# Patient Record
Sex: MALE | Race: WHITE | NOT HISPANIC OR LATINO | Employment: FULL TIME | URBAN - METROPOLITAN AREA
[De-identification: names, ages, dates, MRNs, and addresses within clinical notes are randomized per-mention and may not be internally consistent; named-entity substitution may affect disease eponyms.]

---

## 2017-09-11 ENCOUNTER — HOSPITAL ENCOUNTER (EMERGENCY)
Facility: CLINIC | Age: 27
Discharge: HOME | End: 2017-09-11
Attending: EMERGENCY MEDICINE | Admitting: EMERGENCY MEDICINE
Payer: COMMERCIAL

## 2017-09-11 ENCOUNTER — APPOINTMENT (OUTPATIENT)
Dept: CT IMAGING | Facility: CLINIC | Age: 27
End: 2017-09-11
Attending: EMERGENCY MEDICINE
Payer: COMMERCIAL

## 2017-09-11 VITALS
DIASTOLIC BLOOD PRESSURE: 92 MMHG | WEIGHT: 179.5 LBS | OXYGEN SATURATION: 100 % | HEART RATE: 76 BPM | RESPIRATION RATE: 16 BRPM | SYSTOLIC BLOOD PRESSURE: 148 MMHG | TEMPERATURE: 97.5 F

## 2017-09-11 DIAGNOSIS — S30.1XXA ABDOMINAL WALL CONTUSION: ICD-10-CM
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
## 2017-09-11 LAB
ALBUMIN UR-MCNC: NEGATIVE MG/DL
APPEARANCE UR: CLEAR
BILIRUB UR QL STRIP: NEGATIVE
COLOR UR AUTO: YELLOW
CREAT BLD-MCNC: 1 MG/DL (ref 0.66–1.25)
GFR SERPL CREATININE-BSD FRML MDRD: 90 ML/MIN/1.7M2
GLUCOSE UR STRIP-MCNC: NEGATIVE MG/DL
HGB BLD-MCNC: 15.9 G/DL (ref 13.3–17.7)
HGB UR QL STRIP: NEGATIVE
KETONES UR STRIP-MCNC: NEGATIVE MG/DL
LEUKOCYTE ESTERASE UR QL STRIP: NEGATIVE
MUCOUS THREADS #/AREA URNS LPF: PRESENT /LPF
NITRATE UR QL: NEGATIVE
PH UR STRIP: 7 PH (ref 5–7)
RBC #/AREA URNS AUTO: <1 /HPF (ref 0–2)
SOURCE: ABNORMAL
SP GR UR STRIP: 1.01 (ref 1–1.03)
SQUAMOUS #/AREA URNS AUTO: <1 /HPF (ref 0–1)
UROBILINOGEN UR STRIP-MCNC: NORMAL MG/DL (ref 0–2)
WBC #/AREA URNS AUTO: <1 /HPF (ref 0–2)

## 2017-09-11 PROCEDURE — 99285 EMERGENCY DEPT VISIT HI MDM: CPT | Mod: Z6 | Performed by: EMERGENCY MEDICINE

## 2017-09-11 PROCEDURE — 25000128 H RX IP 250 OP 636: Performed by: EMERGENCY MEDICINE

## 2017-09-11 PROCEDURE — 74177 CT ABD & PELVIS W/CONTRAST: CPT

## 2017-09-11 PROCEDURE — 85018 HEMOGLOBIN: CPT | Performed by: EMERGENCY MEDICINE

## 2017-09-11 PROCEDURE — 82565 ASSAY OF CREATININE: CPT

## 2017-09-11 PROCEDURE — 81001 URINALYSIS AUTO W/SCOPE: CPT | Performed by: EMERGENCY MEDICINE

## 2017-09-11 PROCEDURE — 25000125 ZZHC RX 250: Performed by: EMERGENCY MEDICINE

## 2017-09-11 PROCEDURE — 99285 EMERGENCY DEPT VISIT HI MDM: CPT | Mod: 25 | Performed by: EMERGENCY MEDICINE

## 2017-09-11 PROCEDURE — 96360 HYDRATION IV INFUSION INIT: CPT | Performed by: EMERGENCY MEDICINE

## 2017-09-11 RX ORDER — IOPAMIDOL 755 MG/ML
100 INJECTION, SOLUTION INTRAVASCULAR ONCE
Status: COMPLETED | OUTPATIENT
Start: 2017-09-11 | End: 2017-09-11

## 2017-09-11 RX ORDER — IBUPROFEN 200 MG
600 TABLET ORAL 3 TIMES DAILY
Qty: 63 TABLET | Refills: 0 | Status: SHIPPED | OUTPATIENT
Start: 2017-09-11 | End: 2017-09-18

## 2017-09-11 RX ORDER — SODIUM CHLORIDE 9 MG/ML
1000 INJECTION, SOLUTION INTRAVENOUS CONTINUOUS
Status: DISCONTINUED | OUTPATIENT
Start: 2017-09-11 | End: 2017-09-11 | Stop reason: HOSPADM

## 2017-09-11 RX ADMIN — SODIUM CHLORIDE 62 ML: 9 INJECTION, SOLUTION INTRAVENOUS at 11:23

## 2017-09-11 RX ADMIN — SODIUM CHLORIDE 500 ML: 9 INJECTION, SOLUTION INTRAVENOUS at 10:32

## 2017-09-11 RX ADMIN — IOPAMIDOL 88 ML: 755 INJECTION, SOLUTION INTRAVENOUS at 11:22

## 2017-09-11 ASSESSMENT — ENCOUNTER SYMPTOMS
SHORTNESS OF BREATH: 0
DYSURIA: 0
VOMITING: 0
DIFFICULTY URINATING: 0
BLOOD IN STOOL: 0
ANAL BLEEDING: 0
HEMATURIA: 0
FREQUENCY: 0
ABDOMINAL PAIN: 1

## 2017-09-11 NOTE — ED NOTES
Pt to d/c home. Discussed AVS and rx medication. All questions answered at this time. No concern for safe d/c.

## 2017-09-11 NOTE — ED AVS SNAPSHOT
[facility], [place], Emergency Department    [address]    [place] [address]    Phone:  [phone]    Fax:  [phone]                                       John Judd   MRN: [id number]    Department:  [facility], Emergency Department   Date of Visit:  9/11/2017           After Visit Summary Signature Page     I have received my discharge instructions, and my questions have been answered. I have discussed any challenges I see with this plan with the nurse or doctor.    ..........................................................................................................................................  Patient/Patient Representative Signature      ..........................................................................................................................................  Patient Representative Print Name and Relationship to Patient    ..................................................               ................................................  Date                                            Time    ..........................................................................................................................................  Reviewed by Signature/Title    ...................................................              ..............................................  Date                                                            Time

## 2017-09-11 NOTE — ED AVS SNAPSHOT
[facility], Emergency Department    [address]    [place] [address]    Phone:  [phone]    Fax:  [phone]                                       John Judd   MRN: [id number]    Department:  [facility], Emergency Department   Date of Visit:  9/11/2017           Patient Information     Date Of Birth          1990        Your diagnoses for this visit were:     Abdominal wall contusion        You were seen by Clement Herrera MD.        Discharge Instructions       Please make an appointment to follow up with Your Primary Care Provider or our Primary Care Center (phone: [phone]) in one week if not improving.    Discharge References/Attachments     SOFT TISSUE CONTUSION (ENGLISH)      24 Hour Appointment Hotline       To make an appointment at any [facility] clinic, call [phone] ([phone]). If you don't have a family doctor or clinic, we will help you find one. [facility] clinics are conveniently located to serve the needs of you and your family.             Review of your medicines      START taking        Dose / Directions Last dose taken    ibuprofen 200 MG tablet   Commonly known as:  ADVIL/MOTRIN   Dose:  600 mg   Quantity:  63 tablet        Take 3 tablets (600 mg) by mouth 3 times daily for 7 days   Refills:  0                Prescriptions were sent or printed at these locations (1 Prescription)                   Other Prescriptions                Printed at Department/Unit printer (1 of 1)         ibuprofen (ADVIL/MOTRIN) 200 MG tablet                Procedures and tests performed during your visit     CT Abdomen Pelvis w Contrast    Creatinine POCT    Hemoglobin    ISTAT creatinine nursing POCT    Peripheral IV: Standard    UA with Microscopic      Orders Needing Specimen Collection     None      Pending Results     Date and Time Order Name Status Description    9/11/2017 1008 CT Abdomen Pelvis w Contrast Preliminary             Pending Culture Results     No  "orders found from 2017 to 2017.            Pending Results Instructions     If you had any lab results that were not finalized at the time of your Discharge, you can call the ED Lab Result RN at [phone]. You will be contacted by this team for any positive Lab results or changes in treatment. The nurses are available 7 days a week from 10A to 6:30P.  You can leave a message 24 hours per day and they will return your call.        Thank you for choosing [facility]       Thank you for choosing [facility] for your care. Our goal is always to provide you with excellent care. Hearing back from our patients is one way we can continue to improve our services. Please take a few minutes to complete the written survey that you may receive in the mail after you visit with us. Thank you!        [employer] Information     [employer] lets you send messages to your doctor, view your test results, renew your prescriptions, schedule appointments and more. To sign up, go to [email or web address] . Click on \"Log in\" on the left side of the screen, which will take you to the Welcome page. Then click on \"Sign up Now\" on the right side of the page.     You will be asked to enter the access code listed below, as well as some personal information. Please follow the directions to create your username and password.     Your access code is: [email or web address]  Expires: 12/10/2017 12:03 PM     Your access code will  in 90 days. If you need help or a new code, please call your [facility] clinic or [phone].        Care EveryWhere ID     This is your Care EveryWhere ID. This could be used by other organizations to access your [facility] medical records  [id number]        Equal Access to Services     MIRNA LOZANO : Hadii julien Torres, silvestre sharma, milli ramirez . So [facility] [phone].    ATENCIÓN: Si habla español, tiene a mckeon disposición servicios gratuitos de asistencia " juan carlos Lehmankrishna al [phone].    We comply with applicable federal civil rights laws and [place] laws. We do not discriminate on the basis of race, color, national origin, age, disability sex, sexual orientation or gender identity.            After Visit Summary       This is your record. Keep this with you and show to your community pharmacist(s) and doctor(s) at your next visit.
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
## 2017-09-11 NOTE — DISCHARGE INSTRUCTIONS
Please make an appointment to follow up with Your Primary Care Provider or our Primary Care Center (phone: [phone]) in one week if not improving.

## 2018-05-09 ENCOUNTER — OFFICE VISIT - HEALTHEAST (OUTPATIENT)
Dept: FAMILY MEDICINE | Facility: CLINIC | Age: 28
End: 2018-05-09

## 2018-05-09 DIAGNOSIS — Z13.21 SCREENING FOR ENDOCRINE, NUTRITIONAL, METABOLIC AND IMMUNITY DISORDER: ICD-10-CM

## 2018-05-09 DIAGNOSIS — F41.9 ANXIETY AND DEPRESSION: ICD-10-CM

## 2018-05-09 DIAGNOSIS — G43.909 MIGRAINE HEADACHE: ICD-10-CM

## 2018-05-09 DIAGNOSIS — Z13.0 SCREENING FOR ENDOCRINE, NUTRITIONAL, METABOLIC AND IMMUNITY DISORDER: ICD-10-CM

## 2018-05-09 DIAGNOSIS — F32.A ANXIETY AND DEPRESSION: ICD-10-CM

## 2018-05-09 DIAGNOSIS — Z00.00 VISIT FOR PREVENTIVE HEALTH EXAMINATION: ICD-10-CM

## 2018-05-09 DIAGNOSIS — Z13.228 SCREENING FOR ENDOCRINE, NUTRITIONAL, METABOLIC AND IMMUNITY DISORDER: ICD-10-CM

## 2018-05-09 DIAGNOSIS — Z13.29 SCREENING FOR ENDOCRINE, NUTRITIONAL, METABOLIC AND IMMUNITY DISORDER: ICD-10-CM

## 2018-05-09 LAB
ALBUMIN SERPL-MCNC: 4.3 G/DL (ref 3.5–5)
ALP SERPL-CCNC: 44 U/L (ref 45–120)
ALT SERPL W P-5'-P-CCNC: 20 U/L (ref 0–45)
ANION GAP SERPL CALCULATED.3IONS-SCNC: 8 MMOL/L (ref 5–18)
AST SERPL W P-5'-P-CCNC: 16 U/L (ref 0–40)
BILIRUB SERPL-MCNC: 0.9 MG/DL (ref 0–1)
BUN SERPL-MCNC: 11 MG/DL (ref 8–22)
CALCIUM SERPL-MCNC: 10 MG/DL (ref 8.5–10.5)
CHLORIDE BLD-SCNC: 102 MMOL/L (ref 98–107)
CHOLEST SERPL-MCNC: 189 MG/DL
CO2 SERPL-SCNC: 30 MMOL/L (ref 22–31)
CREAT SERPL-MCNC: 0.88 MG/DL (ref 0.7–1.3)
ERYTHROCYTE [DISTWIDTH] IN BLOOD BY AUTOMATED COUNT: 13.1 % (ref 11–14.5)
FASTING STATUS PATIENT QL REPORTED: YES
GFR SERPL CREATININE-BSD FRML MDRD: >60 ML/MIN/1.73M2
GLUCOSE BLD-MCNC: 95 MG/DL (ref 70–125)
HBA1C MFR BLD: 4.9 % (ref 3.5–6)
HCT VFR BLD AUTO: 44 % (ref 40–54)
HDLC SERPL-MCNC: 68 MG/DL
HGB BLD-MCNC: 15.1 G/DL (ref 14–18)
LDLC SERPL CALC-MCNC: 106 MG/DL
MCH RBC QN AUTO: 30.2 PG (ref 27–34)
MCHC RBC AUTO-ENTMCNC: 34.2 G/DL (ref 32–36)
MCV RBC AUTO: 88 FL (ref 80–100)
PLATELET # BLD AUTO: 199 THOU/UL (ref 140–440)
PMV BLD AUTO: 7.3 FL (ref 7–10)
POTASSIUM BLD-SCNC: 4.6 MMOL/L (ref 3.5–5)
PROT SERPL-MCNC: 7.4 G/DL (ref 6–8)
RBC # BLD AUTO: 4.98 MILL/UL (ref 4.4–6.2)
SODIUM SERPL-SCNC: 140 MMOL/L (ref 136–145)
TRIGL SERPL-MCNC: 75 MG/DL
WBC: 5 THOU/UL (ref 4–11)

## 2018-05-09 ASSESSMENT — MIFFLIN-ST. JEOR: SCORE: 1892.26

## 2018-05-10 LAB
25(OH)D3 SERPL-MCNC: 18.2 NG/ML (ref 30–80)
25(OH)D3 SERPL-MCNC: 18.2 NG/ML (ref 30–80)

## 2018-05-11 ENCOUNTER — AMBULATORY - HEALTHEAST (OUTPATIENT)
Dept: FAMILY MEDICINE | Facility: CLINIC | Age: 28
End: 2018-05-11

## 2018-05-11 DIAGNOSIS — E55.9 VITAMIN D DEFICIENCY DISEASE: ICD-10-CM

## 2021-06-01 VITALS — WEIGHT: 196.5 LBS | BODY MASS INDEX: 26.04 KG/M2 | HEIGHT: 73 IN

## 2021-06-16 PROBLEM — G43.909 MIGRAINE HEADACHE: Status: ACTIVE | Noted: 2018-05-09

## 2021-06-16 PROBLEM — E55.9 VITAMIN D DEFICIENCY DISEASE: Status: ACTIVE | Noted: 2018-05-11
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
## 2021-08-08 ENCOUNTER — HEALTH MAINTENANCE LETTER (OUTPATIENT)
Age: 31
End: 2021-08-08

## 2021-09-05 ENCOUNTER — E-VISIT (OUTPATIENT)
Dept: URGENT CARE | Facility: URGENT CARE | Age: 31
End: 2021-09-05
Payer: COMMERCIAL

## 2021-09-05 DIAGNOSIS — Z20.822 SUSPECTED COVID-19 VIRUS INFECTION: Primary | ICD-10-CM

## 2021-09-05 PROCEDURE — 99421 OL DIG E/M SVC 5-10 MIN: CPT | Performed by: FAMILY MEDICINE

## 2021-09-05 NOTE — PATIENT INSTRUCTIONS
Dear John Judd,    Your symptoms show that you may have coronavirus (COVID-19). This illness can cause fever, cough and trouble breathing. Many people get a mild case and get better on their own. Some people can get very sick.    Will I be tested for COVID-19?  We would like to test you for Covid-19 virus. I have placed orders for this test.     For all employees or close contacts (except [employer] and [employer] - see below), go to your [employer] home page and scroll down to the section that says  You have an appointment that needs to be scheduled  and click the large green button that says  Schedule Now  and follow the steps to find the next available opening.     If you are unable to complete these steps or if you cannot find any available times, please call [phone] to schedule employee testing.     [facility] employees or close contacts, please call [phone].   [facility]) employees or close contacts call [phone].    Return to work/school/ guidance:  Please let your workplace manager and staffing office know when your quarantine ends     We can t give you an exact date as it depends on the above. You can calculate this on your own or work with your manager/staffing office to calculate this. (For example if you were exposed on 10/4, you would have to quarantine for 14 full days. That would be through 10/18. You could return on 10/19.)      If you receive a positive COVID-19 test result, follow the guidance of the those who are giving you the results. Usually the return to work is 10 (or in some cases 20 days from symptom onset.) If you work at [facility] [employer], you must also be cleared by Employee Occupational Health and Safety to return to work.        If you receive a negative COVID-19 test result and did not have a high risk exposure to someone with a known positive COVID-19 test, you can return to work once you're free of fever for 24 hours without fever-reducing medication and  your symptoms are improving or resolved.      If you receive a negative COVID-19 test and If you had a high risk exposure to someone who has tested positive for COVID-19 then you can return to work 14 days after your last contact with the positive individual    Note: If you have ongoing exposure to the covid positive person, this quarantine period may be more than 14 days. (For example, if you are continued to be exposed to your child who tested positive and cannot isolate from them, then the quarantine of 7-14 days can't start until your child is no longer contagious. This is typically 10 days from onset of the child's symptoms. So the total duration may be 17-24 days in this case.)    Sign up for [employer].   We know it's scary to hear that you might have COVID-19. We want to track your symptoms to make sure you're okay over the next 2 weeks. Please look for an email from [employer]--this is a free, online program that we'll use to keep in touch. To sign up, follow the link in the email you will receive. Learn more at [email or web address]    How can I take care of myself?    Get lots of rest. Drink extra fluids (unless a doctor has told you not to)    Take Tylenol (acetaminophen) or ibuprofen for fever or pain. If you have liver or kidney problems, ask your family doctor if it's okay to take Tylenol o ibuprofen    If you have other health problems (like cancer, heart failure, an organ transplant or severe kidney disease): Call your specialty clinic if you don't feel better in the next 2 days.    Know when to call 911. Emergency warning signs include:  o Trouble breathing or shortness of breath  o Pain or pressure in the chest that doesn't go away  o Feeling confused like you haven't felt before, or not being able to wake up  o Bluish-colored lips or face    Where can I get more information?   [employer] - About COVID-19:   [email or web address]    CDC - What to Do If You're Sick:    www.cdc.gov/coronavirus/2019-ncov/about/steps-when-sick.html

## 2021-09-07 ENCOUNTER — LAB (OUTPATIENT)
Dept: FAMILY MEDICINE | Facility: CLINIC | Age: 31
End: 2021-09-07
Attending: FAMILY MEDICINE
Payer: COMMERCIAL

## 2021-09-07 DIAGNOSIS — Z20.822 SUSPECTED COVID-19 VIRUS INFECTION: ICD-10-CM

## 2021-09-07 PROCEDURE — U0005 INFEC AGEN DETEC AMPLI PROBE: HCPCS

## 2021-09-07 PROCEDURE — U0003 INFECTIOUS AGENT DETECTION BY NUCLEIC ACID (DNA OR RNA); SEVERE ACUTE RESPIRATORY SYNDROME CORONAVIRUS 2 (SARS-COV-2) (CORONAVIRUS DISEASE [COVID-19]), AMPLIFIED PROBE TECHNIQUE, MAKING USE OF HIGH THROUGHPUT TECHNOLOGIES AS DESCRIBED BY CMS-2020-01-R: HCPCS

## 2021-09-08 LAB — SARS-COV-2 RNA RESP QL NAA+PROBE: NEGATIVE

## 2021-10-03 ENCOUNTER — HEALTH MAINTENANCE LETTER (OUTPATIENT)
Age: 31
End: 2021-10-03

## 2021-10-18 ENCOUNTER — OFFICE VISIT (OUTPATIENT)
Dept: FAMILY MEDICINE | Facility: CLINIC | Age: 31
End: 2021-10-18
Payer: COMMERCIAL

## 2021-10-18 VITALS
DIASTOLIC BLOOD PRESSURE: 82 MMHG | HEIGHT: 73 IN | WEIGHT: 218.9 LBS | HEART RATE: 80 BPM | BODY MASS INDEX: 29.01 KG/M2 | SYSTOLIC BLOOD PRESSURE: 122 MMHG

## 2021-10-18 DIAGNOSIS — Z11.3 SCREEN FOR STD (SEXUALLY TRANSMITTED DISEASE): ICD-10-CM

## 2021-10-18 DIAGNOSIS — G43.109 MIGRAINE WITH AURA AND WITHOUT STATUS MIGRAINOSUS, NOT INTRACTABLE: ICD-10-CM

## 2021-10-18 DIAGNOSIS — E55.9 VITAMIN D DEFICIENCY: ICD-10-CM

## 2021-10-18 DIAGNOSIS — N52.9 MALE ERECTILE DISORDER: ICD-10-CM

## 2021-10-18 DIAGNOSIS — Z11.59 NEED FOR HEPATITIS C SCREENING TEST: ICD-10-CM

## 2021-10-18 DIAGNOSIS — Z13.220 LIPID SCREENING: ICD-10-CM

## 2021-10-18 DIAGNOSIS — Z00.00 ENCOUNTER FOR ROUTINE HISTORY AND PHYSICAL EXAM FOR MALE: Primary | ICD-10-CM

## 2021-10-18 LAB
ALBUMIN SERPL-MCNC: 4.5 G/DL (ref 3.5–5)
ALP SERPL-CCNC: 50 U/L (ref 45–120)
ALT SERPL W P-5'-P-CCNC: 23 U/L (ref 0–45)
ANION GAP SERPL CALCULATED.3IONS-SCNC: 11 MMOL/L (ref 5–18)
AST SERPL W P-5'-P-CCNC: 15 U/L (ref 0–40)
BILIRUB SERPL-MCNC: 0.8 MG/DL (ref 0–1)
BUN SERPL-MCNC: 17 MG/DL (ref 8–22)
CALCIUM SERPL-MCNC: 10.1 MG/DL (ref 8.5–10.5)
CHLORIDE BLD-SCNC: 102 MMOL/L (ref 98–107)
CHOLEST SERPL-MCNC: 229 MG/DL
CO2 SERPL-SCNC: 27 MMOL/L (ref 22–31)
CREAT SERPL-MCNC: 0.9 MG/DL (ref 0.7–1.3)
FASTING STATUS PATIENT QL REPORTED: YES
GFR SERPL CREATININE-BSD FRML MDRD: >90 ML/MIN/1.73M2
GLUCOSE BLD-MCNC: 96 MG/DL (ref 70–125)
HDLC SERPL-MCNC: 64 MG/DL
HIV 1+2 AB+HIV1 P24 AG SERPL QL IA: NEGATIVE
LDLC SERPL CALC-MCNC: 144 MG/DL
POTASSIUM BLD-SCNC: 4.5 MMOL/L (ref 3.5–5)
PROT SERPL-MCNC: 7.6 G/DL (ref 6–8)
SODIUM SERPL-SCNC: 140 MMOL/L (ref 136–145)
TRIGL SERPL-MCNC: 107 MG/DL
TSH SERPL DL<=0.005 MIU/L-ACNC: 1.14 UIU/ML (ref 0.3–5)

## 2021-10-18 PROCEDURE — 99214 OFFICE O/P EST MOD 30 MIN: CPT | Mod: 25 | Performed by: NURSE PRACTITIONER

## 2021-10-18 PROCEDURE — 86780 TREPONEMA PALLIDUM: CPT | Performed by: NURSE PRACTITIONER

## 2021-10-18 PROCEDURE — 87389 HIV-1 AG W/HIV-1&-2 AB AG IA: CPT | Performed by: NURSE PRACTITIONER

## 2021-10-18 PROCEDURE — 84270 ASSAY OF SEX HORMONE GLOBUL: CPT | Performed by: NURSE PRACTITIONER

## 2021-10-18 PROCEDURE — 82306 VITAMIN D 25 HYDROXY: CPT | Performed by: NURSE PRACTITIONER

## 2021-10-18 PROCEDURE — 99395 PREV VISIT EST AGE 18-39: CPT | Performed by: NURSE PRACTITIONER

## 2021-10-18 PROCEDURE — 80053 COMPREHEN METABOLIC PANEL: CPT | Performed by: NURSE PRACTITIONER

## 2021-10-18 PROCEDURE — 87591 N.GONORRHOEAE DNA AMP PROB: CPT | Performed by: NURSE PRACTITIONER

## 2021-10-18 PROCEDURE — 84403 ASSAY OF TOTAL TESTOSTERONE: CPT | Performed by: NURSE PRACTITIONER

## 2021-10-18 PROCEDURE — 84443 ASSAY THYROID STIM HORMONE: CPT | Performed by: NURSE PRACTITIONER

## 2021-10-18 PROCEDURE — 36415 COLL VENOUS BLD VENIPUNCTURE: CPT | Performed by: NURSE PRACTITIONER

## 2021-10-18 PROCEDURE — 80061 LIPID PANEL: CPT | Performed by: NURSE PRACTITIONER

## 2021-10-18 PROCEDURE — 86803 HEPATITIS C AB TEST: CPT | Performed by: NURSE PRACTITIONER

## 2021-10-18 PROCEDURE — 87491 CHLMYD TRACH DNA AMP PROBE: CPT | Performed by: NURSE PRACTITIONER

## 2021-10-18 RX ORDER — SUMATRIPTAN 25 MG/1
25 TABLET, FILM COATED ORAL
Qty: 10 TABLET | Refills: 3 | Status: SHIPPED | OUTPATIENT
Start: 2021-10-18 | End: 2023-03-30

## 2021-10-18 ASSESSMENT — MIFFLIN-ST. JEOR: SCORE: 2001.8

## 2021-10-18 NOTE — PROGRESS NOTES
Assessment and Plan:     Encounter for routine history and physical exam for male  Recommend consuming a healthy diet and exercising.  He is up-to-date on vaccinations.  I congratulated him on alcohol cessation.  - Comprehensive metabolic panel (BMP + Alb, Alk Phos, ALT, AST, Total. Bili, TP)  - Comprehensive metabolic panel (BMP + Alb, Alk Phos, ALT, AST, Total. Bili, TP)    Lipid screening  - Lipid panel reflex to direct LDL Fasting  - Lipid panel reflex to direct LDL Fasting    Need for hepatitis C screening test  - Hepatitis C antibody  - Hepatitis C antibody    Screen for STD (sexually transmitted disease)  Discussed safe sex practices.  Will notify patient of results.  - HIV Antigen Antibody Combo  - Treponema Abs w Reflex to RPR and Titer  - Chlamydia trachomatis PCR  - Neisseria gonorrhoeae PCR  - HIV Antigen Antibody Combo  - Treponema Abs w Reflex to RPR and Titer    Migraine with aura and without status migrainosus, not intractable  Discussed treatment options.  Will start sumatriptan as needed.  Educated on indications and side effects.  If no relief, may consider prophylactic medication.  - SUMAtriptan (IMITREX) 25 MG tablet  Dispense: 10 tablet; Refill: 3    Male erectile disorder  We will rule out thyroid disease and hypogonadism.  Discussed the mental aspect erections.  Anxiety may be contributing.  - TSH with free T4 reflex  - Testosterone Free and Total  - TSH with free T4 reflex  - Testosterone Free and Total    Vitamin D deficiency  He has a history of vitamin D deficiency.  He does occasionally take a vitamin D supplement.  - Vitamin D Deficiency  - Vitamin D Deficiency        Subjective:     John is a 31 year old male presenting to the clinic for a male physical.  Patient has been  since 2017.  He has 2-year-old son.  Over the past year, he has engaged in an open marriage.  He is interested in STD screening.  He denies any penile discharge or irritation.  He denies dysuria.  His wife  recently tested negative for STDs.  They are considering trying to conceive another child.  Patient is trying to consume healthy diet.  He does not exercise, but states they bought an elliptical recently and he plans on exercising more during the winter.  He has a history of migraine headaches.  He was diagnosed at the age of 12.  Over the past year, his headaches have worsened.  He is obtaining a headache 3 times per week within the bilateral temporal region.  He does have some chronic neck pain and has been seeing a chiropractor.  He complains of blurry vision and an aura.  He experiences photophobia.  He denies nausea, vomiting, muscular weakness.  He occasionally has numbness and tingling within his hands.  He has tried Tylenol, Advil, Excedrin with minimal relief.  Lastly, patient states over the past few months he has had difficulty obtaining and maintaining an erection. He is interested in evaluating this further.  He has had some minimal weight gain.  He states he and his wife were abusing alcohol from May-September.  They quit drinking Labor Day.     Answers for HPI/ROS submitted by the patient on 10/15/2021  Frequency of exercise:: 1 day/week  Getting at least 3 servings of Calcium per day:: Yes  Diet:: Regular (no restrictions)  Taking medications regularly:: Yes  Medication side effects:: Not applicable  Bi-annual eye exam:: Yes  Dental care twice a year:: Yes  Sleep apnea or symptoms of sleep apnea:: Daytime drowsiness  Additional concerns today:: Yes  Duration of exercise:: Other    Reviewof Systems: A complete 14 point review of systems was obtained and is negative or as stated in the history of present illness.    Social History     Socioeconomic History     Marital status:      Spouse name: Not on file     Number of children: Not on file     Years of education: Not on file     Highest education level: Not on file   Occupational History     Not on file   Tobacco Use     Smoking status: Never  "Smoker     Smokeless tobacco: Never Used   Substance and Sexual Activity     Alcohol use: Yes     Comment: social     Drug use: No     Sexual activity: Yes     Partners: Female   Other Topics Concern     Not on file   Social History Narrative     Not on file     Social Determinants of Health     Financial Resource Strain:      Difficulty of Paying Living Expenses:    Food Insecurity:      Worried About Running Out of Food in the Last Year:      Ran Out of Food in the Last Year:    Transportation Needs:      Lack of Transportation (Medical):      Lack of Transportation (Non-Medical):    Physical Activity:      Days of Exercise per Week:      Minutes of Exercise per Session:    Stress:      Feeling of Stress :    Social Connections:      Frequency of Communication with Friends and Family:      Frequency of Social Gatherings with Friends and Family:      Attends Druze Services:      Active Member of Clubs or Organizations:      Attends Club or Organization Meetings:      Marital Status:    Intimate Partner Violence:      Fear of Current or Ex-Partner:      Emotionally Abused:      Physically Abused:      Sexually Abused:        Active Ambulatory Problems     Diagnosis Date Noted     Migraine headache 05/09/2018     Vitamin D deficiency disease 05/11/2018     Resolved Ambulatory Problems     Diagnosis Date Noted     No Resolved Ambulatory Problems     No Additional Past Medical History       Family History   Problem Relation Age of Onset     Diabetes Mother         Type 2 DM     Hyperlipidemia Mother      Diabetes Father         Type 2 DM     Hyperlipidemia Father      GI problems Paternal Grandmother        Objective:     /82 (BP Location: Right arm, Patient Position: Sitting, Cuff Size: Adult Large)   Pulse 80   Ht 1.854 m (6' 1\")   Wt 99.3 kg (218 lb 14.4 oz)   BMI 28.88 kg/m      Patient is alert, in no obvious distress.   Skin: Warm, dry.  No lesions or rashes.  Skin turgor rapid return.   HEENT:  " Head normocephalic, atraumatic.  Eyes normal.   Ears normal.  Nose patent, mucosa pink.  Oropharynx mucosa pink.  No lesions or tonsillar enlargement.   Neck: Supple, no lymphadenopathy. No thyromegaly.  Lungs:  Clear to auscultation. Respirations even and unlabored.  No wheezing or rales noted.   Heart:  Regular rate and rhythm.  No murmurs, S3, S4, gallops, or rubs.    Abdomen: Soft, nontender.  No organomegaly. Bowel sounds normoactive. No guarding or masses noted.   :  deferred  Musculoskeletal:  Full ROM of extremities.    Neurological: No obvious gross or fine motor deficits are present.

## 2021-10-19 LAB
DEPRECATED CALCIDIOL+CALCIFEROL SERPL-MC: 25 UG/L (ref 30–80)
HCV AB SERPL QL IA: NEGATIVE
SHBG SERPL-SCNC: 20 NMOL/L (ref 11–80)
T PALLIDUM AB SER QL: NEGATIVE

## 2021-10-20 LAB
C TRACH DNA SPEC QL NAA+PROBE: NEGATIVE
N GONORRHOEA DNA SPEC QL NAA+PROBE: NEGATIVE

## 2021-10-21 LAB
SHBG SERPL-SCNC: 20 NMOL/L (ref 11–80)
TESTOST FREE SERPL-MCNC: 6.82 NG/DL
TESTOST SERPL-MCNC: 270 NG/DL (ref 240–950)

## 2022-09-11 ENCOUNTER — HEALTH MAINTENANCE LETTER (OUTPATIENT)
Age: 32
End: 2022-09-11

## 2023-01-22 ENCOUNTER — HEALTH MAINTENANCE LETTER (OUTPATIENT)
Age: 33
End: 2023-01-22

## 2023-03-29 ASSESSMENT — ENCOUNTER SYMPTOMS
EYE PAIN: 0
HEMATURIA: 0
DYSURIA: 0
ARTHRALGIAS: 1
HEADACHES: 1
ABDOMINAL PAIN: 0
MYALGIAS: 1
FEVER: 0
PARESTHESIAS: 1
NERVOUS/ANXIOUS: 1
DIARRHEA: 0
SHORTNESS OF BREATH: 0
SORE THROAT: 0
CONSTIPATION: 0
HEARTBURN: 0
COUGH: 0
CHILLS: 0
DIZZINESS: 0
HEMATOCHEZIA: 0
JOINT SWELLING: 0
WEAKNESS: 0
FREQUENCY: 0
NAUSEA: 0
PALPITATIONS: 0

## 2023-03-30 ENCOUNTER — OFFICE VISIT (OUTPATIENT)
Dept: FAMILY MEDICINE | Facility: CLINIC | Age: 33
End: 2023-03-30
Payer: COMMERCIAL

## 2023-03-30 VITALS
HEIGHT: 73 IN | SYSTOLIC BLOOD PRESSURE: 128 MMHG | WEIGHT: 219.2 LBS | TEMPERATURE: 98.2 F | BODY MASS INDEX: 29.05 KG/M2 | HEART RATE: 87 BPM | RESPIRATION RATE: 16 BRPM | DIASTOLIC BLOOD PRESSURE: 87 MMHG | OXYGEN SATURATION: 99 %

## 2023-03-30 DIAGNOSIS — M54.50 LUMBAR PAIN: ICD-10-CM

## 2023-03-30 DIAGNOSIS — R21 RASH: ICD-10-CM

## 2023-03-30 DIAGNOSIS — Z79.899 MEDICATION MANAGEMENT: ICD-10-CM

## 2023-03-30 DIAGNOSIS — Z13.220 LIPID SCREENING: ICD-10-CM

## 2023-03-30 DIAGNOSIS — M54.2 NECK PAIN: ICD-10-CM

## 2023-03-30 DIAGNOSIS — G43.109 MIGRAINE WITH AURA AND WITHOUT STATUS MIGRAINOSUS, NOT INTRACTABLE: ICD-10-CM

## 2023-03-30 DIAGNOSIS — Z11.3 SCREENING FOR STD (SEXUALLY TRANSMITTED DISEASE): ICD-10-CM

## 2023-03-30 DIAGNOSIS — Z00.00 ENCOUNTER FOR ROUTINE HISTORY AND PHYSICAL EXAM FOR MALE: Primary | ICD-10-CM

## 2023-03-30 LAB
ALBUMIN SERPL BCG-MCNC: 4.7 G/DL (ref 3.5–5.2)
ALP SERPL-CCNC: 58 U/L (ref 40–129)
ALT SERPL W P-5'-P-CCNC: 30 U/L (ref 10–50)
ANION GAP SERPL CALCULATED.3IONS-SCNC: 11 MMOL/L (ref 7–15)
AST SERPL W P-5'-P-CCNC: 21 U/L (ref 10–50)
BILIRUB SERPL-MCNC: 0.6 MG/DL
BUN SERPL-MCNC: 16.3 MG/DL (ref 6–20)
CALCIUM SERPL-MCNC: 9.7 MG/DL (ref 8.6–10)
CHLORIDE SERPL-SCNC: 104 MMOL/L (ref 98–107)
CHOLEST SERPL-MCNC: 183 MG/DL
CREAT SERPL-MCNC: 0.9 MG/DL (ref 0.67–1.17)
DEPRECATED HCO3 PLAS-SCNC: 26 MMOL/L (ref 22–29)
GFR SERPL CREATININE-BSD FRML MDRD: >90 ML/MIN/1.73M2
GLUCOSE SERPL-MCNC: 87 MG/DL (ref 70–99)
HBV SURFACE AB SERPL IA-ACNC: 2.15 M[IU]/ML
HBV SURFACE AB SERPL IA-ACNC: NONREACTIVE M[IU]/ML
HDLC SERPL-MCNC: 52 MG/DL
HIV 1+2 AB+HIV1 P24 AG SERPL QL IA: NONREACTIVE
LDLC SERPL CALC-MCNC: 112 MG/DL
NONHDLC SERPL-MCNC: 131 MG/DL
POTASSIUM SERPL-SCNC: 4.2 MMOL/L (ref 3.4–5.3)
PROT SERPL-MCNC: 7.6 G/DL (ref 6.4–8.3)
SODIUM SERPL-SCNC: 141 MMOL/L (ref 136–145)
TRIGL SERPL-MCNC: 96 MG/DL

## 2023-03-30 PROCEDURE — 99213 OFFICE O/P EST LOW 20 MIN: CPT | Mod: 25 | Performed by: NURSE PRACTITIONER

## 2023-03-30 PROCEDURE — 86706 HEP B SURFACE ANTIBODY: CPT | Performed by: NURSE PRACTITIONER

## 2023-03-30 PROCEDURE — 80053 COMPREHEN METABOLIC PANEL: CPT | Performed by: NURSE PRACTITIONER

## 2023-03-30 PROCEDURE — 87491 CHLMYD TRACH DNA AMP PROBE: CPT | Performed by: NURSE PRACTITIONER

## 2023-03-30 PROCEDURE — 87591 N.GONORRHOEAE DNA AMP PROB: CPT | Performed by: NURSE PRACTITIONER

## 2023-03-30 PROCEDURE — 36415 COLL VENOUS BLD VENIPUNCTURE: CPT | Performed by: NURSE PRACTITIONER

## 2023-03-30 PROCEDURE — 99395 PREV VISIT EST AGE 18-39: CPT | Performed by: NURSE PRACTITIONER

## 2023-03-30 PROCEDURE — 87389 HIV-1 AG W/HIV-1&-2 AB AG IA: CPT | Performed by: NURSE PRACTITIONER

## 2023-03-30 PROCEDURE — 80061 LIPID PANEL: CPT | Performed by: NURSE PRACTITIONER

## 2023-03-30 PROCEDURE — 86780 TREPONEMA PALLIDUM: CPT | Performed by: NURSE PRACTITIONER

## 2023-03-30 RX ORDER — SUMATRIPTAN 25 MG/1
25 TABLET, FILM COATED ORAL
Qty: 10 TABLET | Refills: 11 | Status: SHIPPED | OUTPATIENT
Start: 2023-03-30 | End: 2023-06-30

## 2023-03-30 ASSESSMENT — PAIN SCALES - GENERAL: PAINLEVEL: MODERATE PAIN (5)

## 2023-03-30 NOTE — PROGRESS NOTES
Assessment and Plan:     Encounter for routine history and physical exam for male  Recommend consuming a healthy diet and exercising.  Will obtain hepatitis B titer.  He is otherwise up-to-date on vaccinations.  He declines COVID-vaccine today.  - Hepatitis B Surface Antibody  - Hepatitis B Surface Antibody    Lipid screening  - Lipid panel reflex to direct LDL Fasting  - Lipid panel reflex to direct LDL Fasting    Screening for STD (sexually transmitted disease)  Discussed safe sex practices.  Will notify patient of results.  - HIV Antigen Antibody Combo  - Treponema Abs w Reflex to RPR and Titer  - Chlamydia trachomatis PCR  - Neisseria gonorrhoeae PCR  - HIV Antigen Antibody Combo  - Treponema Abs w Reflex to RPR and Titer    Migraine with aura and without status migrainosus, not intractable  Neck pain  Lumbar pain  Patient continues sumatriptan as needed.  Will refer to PT.  - SUMAtriptan (IMITREX) 25 MG tablet  Dispense: 10 tablet; Refill: 11  - Physical Therapy Referral    Rash  Suspect rosacea.  We will treat with metronidazole cream.  If no improvement, may consider referral to dermatology.- metroNIDAZOLE (METROCREAM) 0.75 % external cream  Dispense: 45 g; Refill: 3    Medication management  - Comprehensive metabolic panel (BMP + Alb, Alk Phos, ALT, AST, Total. Bili, TP)  - Comprehensive metabolic panel (BMP + Alb, Alk Phos, ALT, AST, Total. Bili, TP)      Subjective:     John is a 32 year old male presenting to the clinic for a male physical.  Patient has been  since 2017.  He has an open marriage and would like STD screening today.  He is trying to consume a healthy diet.  He admits to intermittent alcohol use.  He is seeking a therapist.  He stopped drinking 2 weeks ago.  He is trying to exercise by performing strength training and elliptical work.  He has a 3-year-old son and a 2-month-old son.  His 2-month-old son was born prematurely but doing well.  He uses sumatriptan as needed for migraine  headaches.  He has intermittent neck and low back pain which is likely contributing.  He plans on seeing a chiropractor and would like to see a physical therapist.  Patient developed a rash on his cheeks at the end of 2022.  He has been applying moisturizing lotion.  He has noticed that the skin is red and dry.    Reviewof Systems: A complete 14 point review of systems was obtained and is negative or as stated in the history of present illness.    Social History     Socioeconomic History     Marital status:      Spouse name: Not on file     Number of children: Not on file     Years of education: Not on file     Highest education level: Not on file   Occupational History     Not on file   Tobacco Use     Smoking status: Never     Smokeless tobacco: Never   Vaping Use     Vaping Use: Never used   Substance and Sexual Activity     Alcohol use: Not Currently     Drug use: No     Sexual activity: Yes     Partners: Female   Other Topics Concern     Not on file   Social History Narrative     Not on file     Social Determinants of Health     Financial Resource Strain: Not on file   Food Insecurity: Not on file   Transportation Needs: Not on file   Physical Activity: Not on file   Stress: Not on file   Social Connections: Not on file   Intimate Partner Violence: Not on file   Housing Stability: Not on file       Active Ambulatory Problems     Diagnosis Date Noted     Migraine headache 05/09/2018     Vitamin D deficiency disease 05/11/2018     Resolved Ambulatory Problems     Diagnosis Date Noted     No Resolved Ambulatory Problems     No Additional Past Medical History       Family History   Problem Relation Age of Onset     Diabetes Mother         Type 2 DM     Hyperlipidemia Mother      Diabetes Father         Type 2 DM     Hyperlipidemia Father      GI problems Paternal Grandmother        Objective:     /87 (BP Location: Right arm, Patient Position: Sitting, Cuff Size: Adult Large)   Pulse 87   Temp 98.2  F  "(36.8  C) (Oral)   Resp 16   Ht 1.854 m (6' 1\")   Wt 99.4 kg (219 lb 3.2 oz)   SpO2 99%   BMI 28.92 kg/m      Patient is alert, in no obvious distress.   Skin: Warm, dry.  He has a dry scaly erythematous rash present on his cheeks.   HEENT:  Head normocephalic, atraumatic.  Eyes normal.  Ears normal.  Nose patent, mucosa pink.  Oropharynx mucosa pink.  No lesions or tonsillar enlargement.   Neck: Supple, no lymphadenopathy. No thyromegaly.  Lungs:  Clear to auscultation. Respirations even and unlabored.  No wheezing or rales noted.   Heart:  Regular rate and rhythm.  No murmurs, S3, S4, gallops, or rubs.    Abdomen: Soft, nontender.  No organomegaly. Bowel sounds normoactive. No guarding or masses noted.   :  deferred  Musculoskeletal:  Full ROM of extremities.  DTRs symmetrical, sensations intact.  No obvious deformity.  Muscle strength equal +5/5.   Neurological:  Cranial nerves 2-12 intact.              Answers for HPI/ROS submitted by the patient on 3/29/2023  Frequency of exercise:: 1 day/week  Getting at least 3 servings of Calcium per day:: Yes  Diet:: Regular (no restrictions)  Taking medications regularly:: Yes  Medication side effects:: Not applicable  Bi-annual eye exam:: Yes  Dental care twice a year:: Yes  Sleep apnea or symptoms of sleep apnea:: Daytime drowsiness  abdominal pain: No  Blood in stool: No  Blood in urine: No  chest pain: No  chills: No  congestion: No  constipation: No  cough: No  diarrhea: No  dizziness: No  ear pain: No  eye pain: No  nervous/anxious: Yes  fever: No  frequency: No  genital sores: No  headaches: Yes  hearing loss: No  heartburn: No  arthralgias: Yes  joint swelling: No  peripheral edema: No  mood changes: No  myalgias: Yes  nausea: No  dysuria: No  palpitations: No  Skin sensation changes: Yes  sore throat: No  urgency: No  rash: No  shortness of breath: No  visual disturbance: No  weakness: No  impotence: Yes  penile discharge: No  Additional concerns today:: " Yes  Duration of exercise:: 15-30 minutes

## 2023-03-31 LAB
C TRACH DNA SPEC QL NAA+PROBE: NEGATIVE
N GONORRHOEA DNA SPEC QL NAA+PROBE: NEGATIVE
T PALLIDUM AB SER QL: NONREACTIVE

## 2023-04-18 ENCOUNTER — HOSPITAL ENCOUNTER (OUTPATIENT)
Dept: PHYSICAL THERAPY | Facility: REHABILITATION | Age: 33
Discharge: HOME | End: 2023-04-18
Attending: NURSE PRACTITIONER
Payer: COMMERCIAL

## 2023-04-18 DIAGNOSIS — M54.2 NECK PAIN: ICD-10-CM

## 2023-04-18 DIAGNOSIS — M54.50 LUMBAR PAIN: ICD-10-CM

## 2023-04-18 DIAGNOSIS — G43.109 MIGRAINE WITH AURA AND WITHOUT STATUS MIGRAINOSUS, NOT INTRACTABLE: Primary | ICD-10-CM

## 2023-04-18 PROCEDURE — 97140 MANUAL THERAPY 1/> REGIONS: CPT | Mod: GP | Performed by: PHYSICAL THERAPIST

## 2023-04-18 PROCEDURE — 97161 PT EVAL LOW COMPLEX 20 MIN: CPT | Mod: GP | Performed by: PHYSICAL THERAPIST

## 2023-04-18 PROCEDURE — 97110 THERAPEUTIC EXERCISES: CPT | Mod: GP | Performed by: PHYSICAL THERAPIST

## 2023-05-16 ENCOUNTER — HOSPITAL ENCOUNTER (OUTPATIENT)
Dept: PHYSICAL THERAPY | Facility: REHABILITATION | Age: 33
Discharge: HOME | End: 2023-05-16
Attending: NURSE PRACTITIONER
Payer: COMMERCIAL

## 2023-05-16 DIAGNOSIS — M54.50 LUMBAR PAIN: ICD-10-CM

## 2023-05-16 DIAGNOSIS — G43.109 MIGRAINE WITH AURA AND WITHOUT STATUS MIGRAINOSUS, NOT INTRACTABLE: Primary | ICD-10-CM

## 2023-05-16 DIAGNOSIS — M54.2 NECK PAIN: ICD-10-CM

## 2023-05-16 PROCEDURE — 97110 THERAPEUTIC EXERCISES: CPT | Mod: GP | Performed by: PHYSICAL THERAPIST

## 2023-06-28 ENCOUNTER — MYC MEDICAL ADVICE (OUTPATIENT)
Dept: FAMILY MEDICINE | Facility: CLINIC | Age: 33
End: 2023-06-28
Payer: COMMERCIAL

## 2023-06-28 ASSESSMENT — ANXIETY QUESTIONNAIRES
GAD7 TOTAL SCORE: 18
5. BEING SO RESTLESS THAT IT IS HARD TO SIT STILL: MORE THAN HALF THE DAYS
3. WORRYING TOO MUCH ABOUT DIFFERENT THINGS: NEARLY EVERY DAY
7. FEELING AFRAID AS IF SOMETHING AWFUL MIGHT HAPPEN: NEARLY EVERY DAY
2. NOT BEING ABLE TO STOP OR CONTROL WORRYING: MORE THAN HALF THE DAYS
7. FEELING AFRAID AS IF SOMETHING AWFUL MIGHT HAPPEN: NEARLY EVERY DAY
4. TROUBLE RELAXING: NEARLY EVERY DAY
6. BECOMING EASILY ANNOYED OR IRRITABLE: NEARLY EVERY DAY
1. FEELING NERVOUS, ANXIOUS, OR ON EDGE: MORE THAN HALF THE DAYS
8. IF YOU CHECKED OFF ANY PROBLEMS, HOW DIFFICULT HAVE THESE MADE IT FOR YOU TO DO YOUR WORK, TAKE CARE OF THINGS AT HOME, OR GET ALONG WITH OTHER PEOPLE?: EXTREMELY DIFFICULT
GAD7 TOTAL SCORE: 18
IF YOU CHECKED OFF ANY PROBLEMS ON THIS QUESTIONNAIRE, HOW DIFFICULT HAVE THESE PROBLEMS MADE IT FOR YOU TO DO YOUR WORK, TAKE CARE OF THINGS AT HOME, OR GET ALONG WITH OTHER PEOPLE: EXTREMELY DIFFICULT

## 2023-06-28 NOTE — TELEPHONE ENCOUNTER
Please assist him with scheduling a virtual appt.  Okay to use same day/reserved/approval renora.  Thanks.

## 2023-06-28 NOTE — TELEPHONE ENCOUNTER
Writer unable to find any notes about discussion of anxiety/depression meds during OV 3/30/23.    Please advise if pt should be seen in clinic first.    LISA ChoudhuryN, RN  [facility]

## 2023-06-30 ENCOUNTER — OFFICE VISIT (OUTPATIENT)
Dept: FAMILY MEDICINE | Facility: CLINIC | Age: 33
End: 2023-06-30
Payer: COMMERCIAL

## 2023-06-30 VITALS
HEIGHT: 73 IN | OXYGEN SATURATION: 98 % | DIASTOLIC BLOOD PRESSURE: 82 MMHG | SYSTOLIC BLOOD PRESSURE: 128 MMHG | RESPIRATION RATE: 20 BRPM | WEIGHT: 214.5 LBS | TEMPERATURE: 97.7 F | HEART RATE: 68 BPM | BODY MASS INDEX: 28.43 KG/M2

## 2023-06-30 DIAGNOSIS — N52.9 MALE ERECTILE DISORDER: ICD-10-CM

## 2023-06-30 DIAGNOSIS — F41.9 ANXIETY: ICD-10-CM

## 2023-06-30 DIAGNOSIS — F32.2 CURRENT SEVERE EPISODE OF MAJOR DEPRESSIVE DISORDER WITHOUT PSYCHOTIC FEATURES WITHOUT PRIOR EPISODE (H): Primary | ICD-10-CM

## 2023-06-30 PROCEDURE — 90471 IMMUNIZATION ADMIN: CPT | Performed by: NURSE PRACTITIONER

## 2023-06-30 PROCEDURE — 90651 9VHPV VACCINE 2/3 DOSE IM: CPT | Performed by: NURSE PRACTITIONER

## 2023-06-30 PROCEDURE — 90746 HEPB VACCINE 3 DOSE ADULT IM: CPT | Performed by: NURSE PRACTITIONER

## 2023-06-30 PROCEDURE — 99214 OFFICE O/P EST MOD 30 MIN: CPT | Mod: 25 | Performed by: NURSE PRACTITIONER

## 2023-06-30 PROCEDURE — 96127 BRIEF EMOTIONAL/BEHAV ASSMT: CPT | Performed by: NURSE PRACTITIONER

## 2023-06-30 PROCEDURE — 90472 IMMUNIZATION ADMIN EACH ADD: CPT | Performed by: NURSE PRACTITIONER

## 2023-06-30 RX ORDER — SILDENAFIL CITRATE 20 MG/1
TABLET ORAL
Qty: 30 TABLET | Refills: 0 | Status: SHIPPED | OUTPATIENT
Start: 2023-06-30 | End: 2023-09-19

## 2023-06-30 ASSESSMENT — PAIN SCALES - GENERAL: PAINLEVEL: NO PAIN (0)

## 2023-06-30 ASSESSMENT — ANXIETY QUESTIONNAIRES
5. BEING SO RESTLESS THAT IT IS HARD TO SIT STILL: MORE THAN HALF THE DAYS
2. NOT BEING ABLE TO STOP OR CONTROL WORRYING: MORE THAN HALF THE DAYS
6. BECOMING EASILY ANNOYED OR IRRITABLE: NEARLY EVERY DAY
1. FEELING NERVOUS, ANXIOUS, OR ON EDGE: NEARLY EVERY DAY
IF YOU CHECKED OFF ANY PROBLEMS ON THIS QUESTIONNAIRE, HOW DIFFICULT HAVE THESE PROBLEMS MADE IT FOR YOU TO DO YOUR WORK, TAKE CARE OF THINGS AT HOME, OR GET ALONG WITH OTHER PEOPLE: VERY DIFFICULT
3. WORRYING TOO MUCH ABOUT DIFFERENT THINGS: NEARLY EVERY DAY
GAD7 TOTAL SCORE: 16
7. FEELING AFRAID AS IF SOMETHING AWFUL MIGHT HAPPEN: SEVERAL DAYS

## 2023-06-30 ASSESSMENT — PATIENT HEALTH QUESTIONNAIRE - PHQ9
SUM OF ALL RESPONSES TO PHQ QUESTIONS 1-9: 19
5. POOR APPETITE OR OVEREATING: MORE THAN HALF THE DAYS

## 2023-06-30 NOTE — PROGRESS NOTES
Assessment and Plan:     Current severe episode of major depressive disorder without psychotic features without prior episode (H)  Anxiety  PHQ-9 score is 19.  Discussed treatment options.  We will start sertraline 50 mg daily.  Educated on its indications and side effects.  Recommend follow-up in 1 month.  - sertraline (ZOLOFT) 50 MG tablet  Dispense: 30 tablet; Refill: 0    Male erectile disorder  Discussed treatment options.  We will treat with sildenafil as needed.  Educated on its indications and side effects.  Recommend ER evaluation if erection lasts longer than 4 hours.  He is content with the plan.  - sildenafil (REVATIO) 20 MG tablet  Dispense: 30 tablet; Refill: 0        Subjective:     John is a 33 year old male presenting to the clinic for concerns for anxiety and depression.  Patient states he has been feeling down and hopeless for multiple months.  He has lack motivation and less drive at work.  He has found less enjoyment in activities.  He has 4-year-old and 6-month-old sons.  He is seeing a therapist.  He feels supported.  He denies thoughts of suicide.  He and his wife participate in an open marriage, so he has been dating other individuals.  He has had difficulty maintaining an erection.  He is interested in taking sildenafil.    Reviewof Systems: A complete 14 point review of systems was obtained and is negative or as stated in the history of present illness.    Social History     Socioeconomic History     Marital status:      Spouse name: Not on file     Number of children: Not on file     Years of education: Not on file     Highest education level: Not on file   Occupational History     Not on file   Tobacco Use     Smoking status: Never     Smokeless tobacco: Never   Vaping Use     Vaping Use: Never used   Substance and Sexual Activity     Alcohol use: Not Currently     Drug use: No     Sexual activity: Yes     Partners: Female     Birth control/protection: Pull-out method, Condom,  "I.U.D., Spermicide   Other Topics Concern     Parent/sibling w/ CABG, MI or angioplasty before 65F 55M? No   Social History Narrative     Not on file     Social Determinants of Health     Financial Resource Strain: Not on file   Food Insecurity: Not on file   Transportation Needs: Not on file   Physical Activity: Not on file   Stress: Not on file   Social Connections: Not on file   Intimate Partner Violence: Not on file   Housing Stability: Not on file       Active Ambulatory Problems     Diagnosis Date Noted     Migraine headache 05/09/2018     Vitamin D deficiency disease 05/11/2018     Resolved Ambulatory Problems     Diagnosis Date Noted     No Resolved Ambulatory Problems     No Additional Past Medical History       Family History   Problem Relation Age of Onset     Diabetes Mother         Type 2 DM     Hyperlipidemia Mother      Diabetes Father         Type 2 DM     Hyperlipidemia Father      Mental Illness Sister         Transgender sibling with depression/anxiety/bi polar     Bipolar Disorder Sister      GI problems Paternal Grandmother        Objective:     /82   Pulse 68   Temp 97.7  F (36.5  C)   Resp 20   Ht 1.85 m (6' 0.84\")   Wt 97.3 kg (214 lb 8 oz)   SpO2 98%   BMI 28.43 kg/m      Patient is alert, in no obvious distress.   Skin: Warm, dry.    Lungs:  Clear to auscultation. Respirations even and unlabored.  No wheezing or rales noted.   Heart:  Regular rate and rhythm.  No murmurs, S3, S4, gallops, or rubs.              Answers for HPI/ROS submitted by the patient on 6/28/2023  KASANDRA 7 TOTAL SCORE: 18  Depression/Anxiety: Depression & Anxiety  Status since last visit:: worse  Anxiety since last: : worse  Other associated symptoms of depression:: Yes  Other associated symotome: : Yes  Significant life event: : relationship concerns, financial concerns  Anxious:: Yes  Current substance use:: No  How many servings of fruits and vegetables do you eat daily?: 2-3  On average, how many sweetened " beverages do you drink each day (Examples: soda, juice, sweet tea, etc.  Do NOT count diet or artificially sweetened beverages)?: 1  How many minutes a day do you exercise enough to make your heart beat faster?: 10 to 19  How many days a week do you exercise enough to make your heart beat faster?: 4  How many days per week do you miss taking your medication?: 0

## 2023-07-21 NOTE — PROGRESS NOTES
"See Minteost message from the patient on 7/21/2023:    \"Hi Tosha,     I wanted to touch base as it's been 3 weeks of the medication. I do feel like it's been helping. After the first week, I did go up to the full tablet once per day. I'm not sure if increasing the dosage would provide additional help as well.      Do I need to schedule another appointment or request a refill for the next prescription? Let me know whatever the next steps are!\"    See office visit notes from the PCP on 6/30/23:     \"Assessment and Plan:      Current severe episode of major depressive disorder without psychotic features without prior episode (H)  Anxiety  PHQ-9 score is 19.  Discussed treatment options.  We will start sertraline 50 mg daily.  Educated on its indications and side effects.  Recommend follow-up in 1 month.  - sertraline (ZOLOFT) 50 MG tablet  Dispense: 30 tablet; Refill: 0\"      Per office visit notes on 6/30/23 from the PCP, it is recommended the patient be seen again in 1 month after starting the sertraline.     Writer sent the patient a [employer] message to call into the clinic to schedule a recheck appointment with his PCP.    Mckenzie Johnson RN, BSN  [facility]    "

## 2023-08-10 NOTE — PROGRESS NOTES
DISCHARGE  Reason for Discharge: Patient has met all goals.    Equipment Issued: NA    Discharge Plan: Patient to continue home program.    Referring Provider:  Tosha Cain

## 2023-08-10 NOTE — ADDENDUM NOTE
Encounter addended by: Brandon Raygoza, PT on: 8/10/2023 8:04 AM   Actions taken: Episode resolved, Clinical Note Signed

## 2023-09-06 ENCOUNTER — TELEPHONE (OUTPATIENT)
Dept: FAMILY MEDICINE | Facility: CLINIC | Age: 33
End: 2023-09-06
Payer: COMMERCIAL

## 2023-09-15 ENCOUNTER — ALLIED HEALTH/NURSE VISIT (OUTPATIENT)
Dept: FAMILY MEDICINE | Facility: CLINIC | Age: 33
End: 2023-09-15
Payer: COMMERCIAL

## 2023-09-15 DIAGNOSIS — Z23 ENCOUNTER FOR IMMUNIZATION: Primary | ICD-10-CM

## 2023-09-15 PROCEDURE — 90746 HEPB VACCINE 3 DOSE ADULT IM: CPT

## 2023-09-15 PROCEDURE — 99207 PR NO CHARGE NURSE ONLY: CPT

## 2023-09-15 PROCEDURE — 90471 IMMUNIZATION ADMIN: CPT

## 2023-09-15 PROCEDURE — 90472 IMMUNIZATION ADMIN EACH ADD: CPT

## 2023-09-15 PROCEDURE — 90651 9VHPV VACCINE 2/3 DOSE IM: CPT

## 2023-09-19 ENCOUNTER — OFFICE VISIT (OUTPATIENT)
Dept: FAMILY MEDICINE | Facility: CLINIC | Age: 33
End: 2023-09-19
Payer: COMMERCIAL

## 2023-09-19 VITALS
WEIGHT: 208.6 LBS | HEART RATE: 79 BPM | DIASTOLIC BLOOD PRESSURE: 80 MMHG | RESPIRATION RATE: 14 BRPM | TEMPERATURE: 97.5 F | SYSTOLIC BLOOD PRESSURE: 125 MMHG | BODY MASS INDEX: 27.65 KG/M2 | HEIGHT: 73 IN | OXYGEN SATURATION: 98 %

## 2023-09-19 DIAGNOSIS — N52.9 MALE ERECTILE DISORDER: ICD-10-CM

## 2023-09-19 DIAGNOSIS — F41.9 ANXIETY: Primary | ICD-10-CM

## 2023-09-19 DIAGNOSIS — F32.0 CURRENT MILD EPISODE OF MAJOR DEPRESSIVE DISORDER WITHOUT PRIOR EPISODE (H): ICD-10-CM

## 2023-09-19 PROCEDURE — 99214 OFFICE O/P EST MOD 30 MIN: CPT | Mod: 25 | Performed by: NURSE PRACTITIONER

## 2023-09-19 PROCEDURE — 90686 IIV4 VACC NO PRSV 0.5 ML IM: CPT | Performed by: NURSE PRACTITIONER

## 2023-09-19 PROCEDURE — 90471 IMMUNIZATION ADMIN: CPT | Performed by: NURSE PRACTITIONER

## 2023-09-19 PROCEDURE — 96127 BRIEF EMOTIONAL/BEHAV ASSMT: CPT | Performed by: NURSE PRACTITIONER

## 2023-09-19 RX ORDER — SERTRALINE HYDROCHLORIDE 100 MG/1
100 TABLET, FILM COATED ORAL DAILY
Qty: 30 TABLET | Refills: 0 | Status: SHIPPED | OUTPATIENT
Start: 2023-09-19 | End: 2023-10-20

## 2023-09-19 RX ORDER — TADALAFIL 5 MG/1
5 TABLET ORAL EVERY 24 HOURS
Qty: 30 TABLET | Refills: 0 | Status: SHIPPED | OUTPATIENT
Start: 2023-09-19 | End: 2024-02-08

## 2023-09-19 ASSESSMENT — PATIENT HEALTH QUESTIONNAIRE - PHQ9
SUM OF ALL RESPONSES TO PHQ QUESTIONS 1-9: 4
10. IF YOU CHECKED OFF ANY PROBLEMS, HOW DIFFICULT HAVE THESE PROBLEMS MADE IT FOR YOU TO DO YOUR WORK, TAKE CARE OF THINGS AT HOME, OR GET ALONG WITH OTHER PEOPLE: SOMEWHAT DIFFICULT
SUM OF ALL RESPONSES TO PHQ QUESTIONS 1-9: 4

## 2023-09-19 ASSESSMENT — ANXIETY QUESTIONNAIRES
GAD7 TOTAL SCORE: 6
4. TROUBLE RELAXING: SEVERAL DAYS
6. BECOMING EASILY ANNOYED OR IRRITABLE: SEVERAL DAYS
IF YOU CHECKED OFF ANY PROBLEMS ON THIS QUESTIONNAIRE, HOW DIFFICULT HAVE THESE PROBLEMS MADE IT FOR YOU TO DO YOUR WORK, TAKE CARE OF THINGS AT HOME, OR GET ALONG WITH OTHER PEOPLE: SOMEWHAT DIFFICULT
2. NOT BEING ABLE TO STOP OR CONTROL WORRYING: SEVERAL DAYS
5. BEING SO RESTLESS THAT IT IS HARD TO SIT STILL: SEVERAL DAYS
1. FEELING NERVOUS, ANXIOUS, OR ON EDGE: SEVERAL DAYS
GAD7 TOTAL SCORE: 6
3. WORRYING TOO MUCH ABOUT DIFFERENT THINGS: SEVERAL DAYS
7. FEELING AFRAID AS IF SOMETHING AWFUL MIGHT HAPPEN: NOT AT ALL

## 2023-09-19 ASSESSMENT — PAIN SCALES - GENERAL: PAINLEVEL: NO PAIN (0)

## 2023-09-19 NOTE — PROGRESS NOTES
Assessment and Plan:     Anxiety  Current mild episode of major depressive disorder without prior episode (H)  PHQ9 score is 4 and KASANDRA score is 6.  Symptoms are uncontrolled according to the patient.  Discussed treatment options.  We will increase sertraline to 100 mg daily.  Educated on its indications and side effects.  I encouraged follow-up in 1 month.  - sertraline (ZOLOFT) 100 MG tablet  Dispense: 30 tablet; Refill: 0    Male erectile disorder  Patient like to try Cialis.  Sent prescription for 5 mg tablets to take as needed to the pharmacy. He is content with the plan.   - tadalafil (CIALIS) 5 MG tablet  Dispense: 30 tablet; Refill: 0        Subjective:     John is a 33 year old male presenting to the clinic for medication management.  Patient is taking sertraline 50 mg daily for anxiety and depression.  Patient found relief with the medication initially.  He felt less irritable and more calm.  Over the past few weeks, he has noticed more irritability and mood swings.  Patient denies any side effects of the medication.  He has tried exercising more and has avoided alcohol consumption for 40 days.  He denies thoughts of suicide and feels well supported.  Patient is prescribed sildenafil to assist with male erectile dysfunction.  He found some relief with the medication, but admits to taking a friend's Cialis and found that Cialis works better for him.  Patient is part of an open marriage and denies concerns for STDs today.    Reviewof Systems: A complete 14 point review of systems was obtained and is negative or as stated in the history of present illness.    Social History     Socioeconomic History    Marital status:      Spouse name: Not on file    Number of children: Not on file    Years of education: Not on file    Highest education level: Not on file   Occupational History    Not on file   Tobacco Use    Smoking status: Never     Passive exposure: Never    Smokeless tobacco: Never   Vaping Use     "Vaping Use: Never used   Substance and Sexual Activity    Alcohol use: Not Currently    Drug use: No    Sexual activity: Yes     Partners: Female     Birth control/protection: Pull-out method, Condom, I.U.D., Spermicide   Other Topics Concern    Parent/sibling w/ CABG, MI or angioplasty before 65F 55M? No   Social History Narrative    Not on file     Social Determinants of Health     Financial Resource Strain: Not on file   Food Insecurity: Not on file   Transportation Needs: Not on file   Physical Activity: Not on file   Stress: Not on file   Social Connections: Not on file   Intimate Partner Violence: Not on file   Housing Stability: Not on file       Active Ambulatory Problems     Diagnosis Date Noted    Migraine headache 05/09/2018    Vitamin D deficiency disease 05/11/2018     Resolved Ambulatory Problems     Diagnosis Date Noted    No Resolved Ambulatory Problems     No Additional Past Medical History       Family History   Problem Relation Age of Onset    Diabetes Mother         Type 2 DM    Hyperlipidemia Mother     Diabetes Father         Type 2 DM    Hyperlipidemia Father     Mental Illness Sister         Transgender sibling with depression/anxiety/bi polar    Bipolar Disorder Sister     GI problems Paternal Grandmother        Objective:     /80   Pulse 79   Temp 97.5  F (36.4  C)   Resp 14   Ht 1.842 m (6' 0.5\")   Wt 94.6 kg (208 lb 9.6 oz)   SpO2 98%   BMI 27.90 kg/m      Patient is alert, in no obvious distress.   Skin: Warm, dry.    Lungs:  Clear to auscultation. Respirations even and unlabored.  No wheezing or rales noted.   Heart:  Regular rate and rhythm.  No murmurs.           Answers submitted by the patient for this visit:  Patient Health Questionnaire (Submitted on 9/19/2023)  If you checked off any problems, how difficult have these problems made it for you to do your work, take care of things at home, or get along with other people?: Somewhat difficult  PHQ9 TOTAL SCORE: 4  KASANDRA-7 " (Submitted on 9/19/2023)  KASANDRA 7 TOTAL SCORE: 6  Depression / Anxiety Questionnaire (Submitted on 9/19/2023)  Chief Complaint: Chronic problems general questions HPI Form  Depression/Anxiety: Depression & Anxiety  Depression & Anxiety (Submitted on 9/19/2023)  Chief Complaint: Chronic problems general questions HPI Form  Status since last visit:: no change  Anxiety since last: : medium  Other associated symptoms of depression:: No  Other associated symotome: : No  Significant life event: : No  Anxious:: Yes  Current substance use:: No  General Questionnaire (Submitted on 9/19/2023)  Chief Complaint: Chronic problems general questions HPI Form  How many servings of fruits and vegetables do you eat daily?: 2-3  On average, how many sweetened beverages do you drink each day (Examples: soda, juice, sweet tea, etc.  Do NOT count diet or artificially sweetened beverages)?: 1  How many minutes a day do you exercise enough to make your heart beat faster?: 20 to 29  How many days a week do you exercise enough to make your heart beat faster?: 3 or less  How many days per week do you miss taking your medication?: 0

## 2023-09-22 ENCOUNTER — OFFICE VISIT (OUTPATIENT)
Dept: FAMILY MEDICINE | Facility: CLINIC | Age: 33
End: 2023-09-22
Payer: COMMERCIAL

## 2023-09-22 VITALS
SYSTOLIC BLOOD PRESSURE: 120 MMHG | BODY MASS INDEX: 27.55 KG/M2 | TEMPERATURE: 97.9 F | DIASTOLIC BLOOD PRESSURE: 80 MMHG | WEIGHT: 206 LBS | OXYGEN SATURATION: 98 % | HEART RATE: 70 BPM | RESPIRATION RATE: 14 BRPM

## 2023-09-22 DIAGNOSIS — N52.9 MALE ERECTILE DISORDER: ICD-10-CM

## 2023-09-22 DIAGNOSIS — F41.9 ANXIETY: ICD-10-CM

## 2023-09-22 DIAGNOSIS — Z30.09 ENCOUNTER FOR VASECTOMY COUNSELING: Primary | ICD-10-CM

## 2023-09-22 DIAGNOSIS — F32.0 CURRENT MILD EPISODE OF MAJOR DEPRESSIVE DISORDER WITHOUT PRIOR EPISODE (H): ICD-10-CM

## 2023-09-22 PROCEDURE — 99214 OFFICE O/P EST MOD 30 MIN: CPT | Performed by: FAMILY MEDICINE

## 2023-09-22 ASSESSMENT — PAIN SCALES - GENERAL: PAINLEVEL: NO PAIN (0)

## 2023-09-22 NOTE — PROGRESS NOTES
Assessment/Plan:    Encounter for vasectomy counseling  Contraception counseling reviewed.  Prevasectomy literature was provided.  Risk benefits alternatives were discussed.  Consent form reviewed and signed by patient.  Patient will schedule for vasectomy at his convenience likely October 27, 2023.    Current mild episode of major depressive disorder without prior episode (H)  Recent dose increase of sertraline to 100 mg daily.  PHQ-9 questionnaire 4 out of 27.    Anxiety  Recent dose increase of sertraline to 100 mg daily.  KASANDRA-7 questionnaire 6 out of 21.    Male erectile disorder  Recent prescription for tadalafil 5 mg daily as needed however has not picked up prescription yet.        Subjective:    John Judd is seen today for contraception counseling.  Desires permanent sterilization.  .  2 children.  No personal or family history of bleeding disorder or anesthetic reaction.  Patient with underlying depression and anxiety treated with sertraline recent dose increase to 100 mg daily tolerated well.  Had been prescribed tadalafil 5 mg daily as needed for male erectile disorder.  Has not picked up prescription yet.  Comprehensive review of systems as above otherwise all negative.     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 No pertinent past medical history.     Social History     Tobacco Use    Smoking status: Never     Passive exposure: Never    Smokeless tobacco: Never   Vaping Use    Vaping Use: Never used   Substance Use Topics    Alcohol use: Not Currently    Drug use: No        Current Outpatient Medications   Medication Sig Dispense Refill    sertraline (ZOLOFT) 100 MG tablet Take 1 tablet (100 mg) by mouth daily 30 tablet 0    tadalafil (CIALIS) 5 MG tablet Take 1 tablet (5 mg) by mouth every 24 hours 30 tablet 0          Objective:    Vitals:    09/22/23 1406   BP: 120/80   Pulse: 70   Resp: 14   Temp: 97.9  F (36.6  C)   SpO2: 98%   Weight: 93.4 kg (206 lb)      Body mass index is 27.55 kg/m .    Alert.  No apparent distress.  Circumcised male.  Testes descended bilaterally.  No hydrocele or varicocele.  No inguinal hernia.  Palpable vas deferens bilaterally.  No psychomotor agitation.      This note has been dictated using voice recognition software and as a result may contain minor grammatical errors and unintended word substitutions.       Answers submitted by the patient for this visit:  Depression / Anxiety Questionnaire (Submitted on 9/19/2023)  Chief Complaint: Chronic problems general questions HPI Form  Depression/Anxiety: Depression & Anxiety  Depression & Anxiety (Submitted on 9/19/2023)  Chief Complaint: Chronic problems general questions HPI Form  Status since last visit:: no change  Anxiety since last: : medium  Other associated symptoms of depression:: No  Other associated symotome: : No  Significant life event: : No  Anxious:: Yes  Current substance use:: No  General Questionnaire (Submitted on 9/19/2023)  Chief Complaint: Chronic problems general questions HPI Form  How many servings of fruits and vegetables do you eat daily?: 2-3  On average, how many sweetened beverages do you drink each day (Examples: soda, juice, sweet tea,  etc.  Do NOT count diet or artificially sweetened beverages)?: 1  How many minutes a day do you exercise enough to make your heart beat faster?: 20 to 29  How many days a week do you exercise enough to make your heart beat faster?: 3 or less  How many days per week do you miss taking your medication?: 0

## 2023-10-20 ENCOUNTER — MYC REFILL (OUTPATIENT)
Dept: FAMILY MEDICINE | Facility: CLINIC | Age: 33
End: 2023-10-20
Payer: COMMERCIAL

## 2023-10-20 DIAGNOSIS — F32.0 CURRENT MILD EPISODE OF MAJOR DEPRESSIVE DISORDER WITHOUT PRIOR EPISODE (H): ICD-10-CM

## 2023-10-20 DIAGNOSIS — F41.9 ANXIETY: ICD-10-CM

## 2023-10-23 RX ORDER — SERTRALINE HYDROCHLORIDE 100 MG/1
100 TABLET, FILM COATED ORAL DAILY
Qty: 90 TABLET | Refills: 1 | Status: SHIPPED | OUTPATIENT
Start: 2023-10-23 | End: 2024-04-01

## 2023-11-10 ENCOUNTER — OFFICE VISIT (OUTPATIENT)
Dept: FAMILY MEDICINE | Facility: CLINIC | Age: 33
End: 2023-11-10
Payer: COMMERCIAL

## 2023-11-10 VITALS
HEIGHT: 73 IN | SYSTOLIC BLOOD PRESSURE: 130 MMHG | HEART RATE: 103 BPM | WEIGHT: 213 LBS | BODY MASS INDEX: 28.23 KG/M2 | RESPIRATION RATE: 17 BRPM | OXYGEN SATURATION: 98 % | DIASTOLIC BLOOD PRESSURE: 80 MMHG | TEMPERATURE: 97.7 F

## 2023-11-10 DIAGNOSIS — Z30.2 ENCOUNTER FOR STERILIZATION: Primary | ICD-10-CM

## 2023-11-10 DIAGNOSIS — Z11.3 ROUTINE SCREENING FOR STI (SEXUALLY TRANSMITTED INFECTION): ICD-10-CM

## 2023-11-10 PROCEDURE — 87491 CHLMYD TRACH DNA AMP PROBE: CPT | Performed by: FAMILY MEDICINE

## 2023-11-10 PROCEDURE — 55250 REMOVAL OF SPERM DUCT(S): CPT | Performed by: FAMILY MEDICINE

## 2023-11-10 PROCEDURE — 88302 TISSUE EXAM BY PATHOLOGIST: CPT | Performed by: PATHOLOGY

## 2023-11-10 PROCEDURE — 87591 N.GONORRHOEAE DNA AMP PROB: CPT | Performed by: FAMILY MEDICINE

## 2023-11-10 ASSESSMENT — PAIN SCALES - GENERAL: PAINLEVEL: NO PAIN (0)

## 2023-11-10 NOTE — PROGRESS NOTES
Vasectomy Procedure Note    Indications: 33 year old male desiring permanent sterilization    Pre-operative Diagnosis: Undesired fertility    Post-operative Diagnosis: Undesired fertility    Anesthesia: 1% lidocaine, 3 ml    Procedure Details:    John Judd  is seen today for scheduled vasectomy.  Patient desires permanent sterilization.  Consent form previously reviewed and signed by patient.   Consent form reviewed prior to procedure with physician statement signed.   Patient elects to continue with scheduled procedure.       - Dayana   2 boys - ages 4 and 8 month old   Tobacco:  none   EtOH:  yes but quit > 48 days ago   Mom - DM, cardiovascular issues   Dad - DM, cardiovascular issues   1 older bro -   1 older bro - now transgender female   Surgeries:  wisdom teeth x 4   Hospitalizations:  none   Work:  [employer] working with [employer]   Hobbies:  sports outdoors biking hiking       John Judd was prepped and draped in usual sterile fashion.  Right vas deferens isolated subcutaneously.  1% lidocaine injected superficially then to vas deferens.  15 blade incision performed.  Curved hemostat for blunt dissection.  Towel clip for vas deferens isolation.  Vas deferens sheath removed exposing normal-appearing vas deferens.  4-O chromic suture both proximal and distal segment of vas deferens with central portion excised for pathology.  Electrocautery of vas deferens ends performed.  Distal end then replacing into fascia with 5-0 chromic suture.  Good hemostasis noted.  Vas deferens then replacing into scrotum.  Single 4-0 chromic suture for skin incision closure.    Left vas deferens then isolated in a similar fashion.  1% lidocaine injected superficially and then to level of vas deferens and surrounding tissue.  15 blade incision performed.  Curved hemostat for blunt dissection.  Towel clip for vas deferens isolation.  Vas deferens sheath removed exposing normal-appearing vas deferens.  4-0  chromic suture both proximal and distal segment of vas deferens with central portion excised for pathology.  Electrocautery of vas deferens ends performed.  Distal and replaced in the fascia with 5-0 chromic suture.  Good hemostasis noted.  Vas deferens then replaced into scrotum.  Single 4-0 chromic for skin incision closure.  Triple antibiotic with 4 x 4 gauze pads placed at bilateral incision sites.      Specimen: vas segment, bilateral    Condition: Stable    Complications: none    Plan:  1. Continue contraception until negative sperm analysis. Semen count after 20-25 ejaculations and 12 weeks s/p vasectomy.  2. Warning signs of infection were reviewed.   3. Written home care instructions provided.  Backup contraception until confirmed sterility.  May resume normal bathing after 24 hours.  Avoid strenuous activity times one week.  Ibuprofen or Tylenol OTC for pain management.  Notify with increased swelling, bleeding, or drainage.  Postvasectomy semen analysis in 12 weeks after 20-25 ejaculations to confirm desired sterility.  Call the clinic if excessive pain, bleeding or swelling.

## 2023-11-11 LAB
C TRACH DNA SPEC QL NAA+PROBE: NEGATIVE
N GONORRHOEA DNA SPEC QL NAA+PROBE: NEGATIVE

## 2023-11-17 LAB
PATH REPORT.COMMENTS IMP SPEC: NORMAL
PATH REPORT.COMMENTS IMP SPEC: NORMAL
PATH REPORT.FINAL DX SPEC: NORMAL
PATH REPORT.GROSS SPEC: NORMAL
PATH REPORT.MICROSCOPIC SPEC OTHER STN: NORMAL
PATH REPORT.RELEVANT HX SPEC: NORMAL
PHOTO IMAGE: NORMAL

## 2024-01-09 ENCOUNTER — ALLIED HEALTH/NURSE VISIT (OUTPATIENT)
Dept: FAMILY MEDICINE | Facility: CLINIC | Age: 34
End: 2024-01-09
Payer: COMMERCIAL

## 2024-01-09 DIAGNOSIS — Z23 ENCOUNTER FOR IMMUNIZATION: Primary | ICD-10-CM

## 2024-01-09 PROCEDURE — 90746 HEPB VACCINE 3 DOSE ADULT IM: CPT

## 2024-01-09 PROCEDURE — 99207 PR NO CHARGE NURSE ONLY: CPT

## 2024-01-09 PROCEDURE — 90471 IMMUNIZATION ADMIN: CPT

## 2024-01-09 PROCEDURE — 90651 9VHPV VACCINE 2/3 DOSE IM: CPT

## 2024-01-09 PROCEDURE — 90472 IMMUNIZATION ADMIN EACH ADD: CPT

## 2024-02-08 ENCOUNTER — MYC MEDICAL ADVICE (OUTPATIENT)
Dept: FAMILY MEDICINE | Facility: CLINIC | Age: 34
End: 2024-02-08
Payer: COMMERCIAL

## 2024-02-08 ENCOUNTER — MYC REFILL (OUTPATIENT)
Dept: FAMILY MEDICINE | Facility: CLINIC | Age: 34
End: 2024-02-08
Payer: COMMERCIAL

## 2024-02-08 DIAGNOSIS — Z11.3 SCREENING FOR STD (SEXUALLY TRANSMITTED DISEASE): Primary | ICD-10-CM

## 2024-02-08 DIAGNOSIS — N52.9 MALE ERECTILE DISORDER: ICD-10-CM

## 2024-02-08 RX ORDER — TADALAFIL 5 MG/1
10 TABLET ORAL EVERY 24 HOURS
Qty: 30 TABLET | Refills: 0 | Status: SHIPPED | OUTPATIENT
Start: 2024-02-08 | End: 2024-04-01

## 2024-02-08 NOTE — TELEPHONE ENCOUNTER
Informed pt of info and he verbalized understanding. Assisted with scheduling.    LISA ChoudhuryN, RN  [facility]

## 2024-02-08 NOTE — TELEPHONE ENCOUNTER
I placed the order.  Please assist him with scheduling a lab only appointment.  I recommend he complete an e-visit in the future with each request.  Thanks.

## 2024-02-09 ENCOUNTER — LAB (OUTPATIENT)
Dept: LAB | Facility: CLINIC | Age: 34
End: 2024-02-09
Payer: COMMERCIAL

## 2024-02-09 DIAGNOSIS — Z11.3 SCREENING FOR STD (SEXUALLY TRANSMITTED DISEASE): ICD-10-CM

## 2024-02-09 DIAGNOSIS — Z30.2 ENCOUNTER FOR STERILIZATION: ICD-10-CM

## 2024-02-09 PROCEDURE — 36415 COLL VENOUS BLD VENIPUNCTURE: CPT

## 2024-02-09 PROCEDURE — 86780 TREPONEMA PALLIDUM: CPT

## 2024-02-09 PROCEDURE — 87591 N.GONORRHOEAE DNA AMP PROB: CPT | Performed by: NURSE PRACTITIONER

## 2024-02-09 PROCEDURE — 87389 HIV-1 AG W/HIV-1&-2 AB AG IA: CPT

## 2024-02-09 PROCEDURE — 87491 CHLMYD TRACH DNA AMP PROBE: CPT | Performed by: NURSE PRACTITIONER

## 2024-02-10 LAB
C TRACH DNA SPEC QL NAA+PROBE: NEGATIVE
HIV 1+2 AB+HIV1 P24 AG SERPL QL IA: NONREACTIVE
N GONORRHOEA DNA SPEC QL NAA+PROBE: NEGATIVE
T PALLIDUM AB SER QL: NONREACTIVE

## 2024-04-01 ENCOUNTER — OFFICE VISIT (OUTPATIENT)
Dept: FAMILY MEDICINE | Facility: CLINIC | Age: 34
End: 2024-04-01
Payer: COMMERCIAL

## 2024-04-01 VITALS
WEIGHT: 211 LBS | OXYGEN SATURATION: 98 % | DIASTOLIC BLOOD PRESSURE: 80 MMHG | HEIGHT: 73 IN | SYSTOLIC BLOOD PRESSURE: 124 MMHG | RESPIRATION RATE: 12 BRPM | HEART RATE: 65 BPM | BODY MASS INDEX: 27.96 KG/M2 | TEMPERATURE: 97.3 F

## 2024-04-01 DIAGNOSIS — F32.0 CURRENT MILD EPISODE OF MAJOR DEPRESSIVE DISORDER WITHOUT PRIOR EPISODE (H): ICD-10-CM

## 2024-04-01 DIAGNOSIS — Z00.00 ENCOUNTER FOR ROUTINE HISTORY AND PHYSICAL EXAM FOR MALE: Primary | ICD-10-CM

## 2024-04-01 DIAGNOSIS — N52.9 MALE ERECTILE DISORDER: ICD-10-CM

## 2024-04-01 DIAGNOSIS — Z13.220 LIPID SCREENING: ICD-10-CM

## 2024-04-01 DIAGNOSIS — Z79.899 MEDICATION MANAGEMENT: ICD-10-CM

## 2024-04-01 DIAGNOSIS — F41.9 ANXIETY: ICD-10-CM

## 2024-04-01 LAB
ANION GAP SERPL CALCULATED.3IONS-SCNC: 9 MMOL/L (ref 7–15)
BUN SERPL-MCNC: 16.6 MG/DL (ref 6–20)
CALCIUM SERPL-MCNC: 9.3 MG/DL (ref 8.6–10)
CHLORIDE SERPL-SCNC: 104 MMOL/L (ref 98–107)
CHOLEST SERPL-MCNC: 158 MG/DL
CREAT SERPL-MCNC: 0.86 MG/DL (ref 0.67–1.17)
DEPRECATED HCO3 PLAS-SCNC: 28 MMOL/L (ref 22–29)
EGFRCR SERPLBLD CKD-EPI 2021: >90 ML/MIN/1.73M2
FASTING STATUS PATIENT QL REPORTED: YES
GLUCOSE SERPL-MCNC: 102 MG/DL (ref 70–99)
HDLC SERPL-MCNC: 56 MG/DL
LDLC SERPL CALC-MCNC: 88 MG/DL
NONHDLC SERPL-MCNC: 102 MG/DL
POTASSIUM SERPL-SCNC: 4.5 MMOL/L (ref 3.4–5.3)
SODIUM SERPL-SCNC: 141 MMOL/L (ref 135–145)
TRIGL SERPL-MCNC: 72 MG/DL

## 2024-04-01 PROCEDURE — 99213 OFFICE O/P EST LOW 20 MIN: CPT | Mod: 25 | Performed by: NURSE PRACTITIONER

## 2024-04-01 PROCEDURE — 80061 LIPID PANEL: CPT | Performed by: NURSE PRACTITIONER

## 2024-04-01 PROCEDURE — 36415 COLL VENOUS BLD VENIPUNCTURE: CPT | Performed by: NURSE PRACTITIONER

## 2024-04-01 PROCEDURE — 99395 PREV VISIT EST AGE 18-39: CPT | Performed by: NURSE PRACTITIONER

## 2024-04-01 PROCEDURE — 80048 BASIC METABOLIC PNL TOTAL CA: CPT | Performed by: NURSE PRACTITIONER

## 2024-04-01 RX ORDER — SERTRALINE HYDROCHLORIDE 100 MG/1
100 TABLET, FILM COATED ORAL DAILY
Qty: 90 TABLET | Refills: 3 | Status: SHIPPED | OUTPATIENT
Start: 2024-04-01

## 2024-04-01 RX ORDER — TADALAFIL 5 MG/1
10 TABLET ORAL EVERY 24 HOURS
Qty: 30 TABLET | Refills: 3 | Status: SHIPPED | OUTPATIENT
Start: 2024-04-01

## 2024-04-01 SDOH — HEALTH STABILITY: PHYSICAL HEALTH: ON AVERAGE, HOW MANY DAYS PER WEEK DO YOU ENGAGE IN MODERATE TO STRENUOUS EXERCISE (LIKE A BRISK WALK)?: 4 DAYS

## 2024-04-01 ASSESSMENT — PAIN SCALES - GENERAL: PAINLEVEL: NO PAIN (0)

## 2024-04-01 ASSESSMENT — PATIENT HEALTH QUESTIONNAIRE - PHQ9
SUM OF ALL RESPONSES TO PHQ QUESTIONS 1-9: 4
SUM OF ALL RESPONSES TO PHQ QUESTIONS 1-9: 4
10. IF YOU CHECKED OFF ANY PROBLEMS, HOW DIFFICULT HAVE THESE PROBLEMS MADE IT FOR YOU TO DO YOUR WORK, TAKE CARE OF THINGS AT HOME, OR GET ALONG WITH OTHER PEOPLE: SOMEWHAT DIFFICULT

## 2024-04-01 ASSESSMENT — SOCIAL DETERMINANTS OF HEALTH (SDOH): HOW OFTEN DO YOU GET TOGETHER WITH FRIENDS OR RELATIVES?: TWICE A WEEK

## 2024-04-01 NOTE — PROGRESS NOTES
Assessment and Plan:     Encounter for routine history and physical exam for male  Recommend consuming a healthy diet and exercising.  He declines COVID booster.  Patient declines STD screening today.  He would like STD screening every 3 months.  He plans on submitting this via [employer].    Lipid screening  - Lipid panel reflex to direct LDL Fasting  - Lipid panel reflex to direct LDL Fasting    Anxiety  Current mild episode of major depressive disorder without prior episode (H24)  This is controlled.  He continues sertraline 100 mg daily.  - sertraline (ZOLOFT) 100 MG tablet  Dispense: 90 tablet; Refill: 3    Male erectile disorder  Patient continues Cialis as needed.  - tadalafil (CIALIS) 5 MG tablet  Dispense: 30 tablet; Refill: 3    Medication management  - Basic metabolic panel  (Ca, Cl, CO2, Creat, Gluc, K, Na, BUN)  - Basic metabolic panel  (Ca, Cl, CO2, Creat, Gluc, K, Na, BUN)        Subjective:     John is a 33 year old male presenting to the clinic for a male physical.  Patient is  from his wife, but they are living together.  They are hoping to divorce fall 2025.  Patient has had an open marriage.  He now is committed to his girlfriend.  He has 2 children ages 14 months and 4-1/2 years.  He had a vasectomy.  He has no concerns for STDs today.  He would like STD screening every 3 months and plans on submitting this via [employer].  Patient has been sober for 8 months.  He is consuming a healthy diet.  He has been exercising by performing strength training and cardio.  Patient is taking sertraline 100 mg daily for anxiety and depression.  Symptoms are well-controlled.  He feels supported.  He denies thoughts of suicide.  Patient takes Cialis as needed for male erectile dysfunction.    Reviewof Systems: A complete 14 point review of systems was obtained and is negative or as stated in the history of present illness.    Social History     Socioeconomic History    Marital status:      Spouse  name: Not on file    Number of children: Not on file    Years of education: Not on file    Highest education level: Not on file   Occupational History    Not on file   Tobacco Use    Smoking status: Never     Passive exposure: Never    Smokeless tobacco: Never   Vaping Use    Vaping Use: Never used   Substance and Sexual Activity    Alcohol use: Not Currently    Drug use: Yes     Types: Marijuana    Sexual activity: Yes     Partners: Female     Birth control/protection: Pull-out method, Condom, I.U.D., Spermicide   Other Topics Concern    Parent/sibling w/ CABG, MI or angioplasty before 65F 55M? No   Social History Narrative    Not on file     Social Determinants of Health     Financial Resource Strain: Low Risk  (4/1/2024)    Financial Resource Strain     Within the past 12 months, have you or your family members you live with been unable to get utilities (heat, electricity) when it was really needed?: No   Food Insecurity: Low Risk  (4/1/2024)    Food Insecurity     Within the past 12 months, did you worry that your food would run out before you got money to buy more?: No     Within the past 12 months, did the food you bought just not last and you didn t have money to get more?: No   Transportation Needs: Low Risk  (4/1/2024)    Transportation Needs     Within the past 12 months, has lack of transportation kept you from medical appointments, getting your medicines, non-medical meetings or appointments, work, or from getting things that you need?: No   Physical Activity: Unknown (4/1/2024)    Exercise Vital Sign     Days of Exercise per Week: 4 days     Minutes of Exercise per Session: Not on file   Stress: No Stress Concern Present (4/1/2024)    [employer] of Occupational [employer] - Occupational Stress Questionnaire     Feeling of Stress : Only a little   Social Connections: Unknown (4/1/2024)    Social Connection and Isolation Panel [NHANES]     Frequency of Communication with Friends and Family: Not on file  "    Frequency of Social Gatherings with Friends and Family: Twice a week     Attends Jainism Services: Not on file     Active Member of Clubs or Organizations: Not on file     Attends Club or Organization Meetings: Not on file     Marital Status: Not on file   Interpersonal Safety: Low Risk  (4/1/2024)    Interpersonal Safety     Do you feel physically and emotionally safe where you currently live?: Yes     Within the past 12 months, have you been hit, slapped, kicked or otherwise physically hurt by someone?: No     Within the past 12 months, have you been humiliated or emotionally abused in other ways by your partner or ex-partner?: No   Housing Stability: Low Risk  (4/1/2024)    Housing Stability     Do you have housing? : Yes     Are you worried about losing your housing?: No       Active Ambulatory Problems     Diagnosis Date Noted    Migraine headache 05/09/2018    Vitamin D deficiency disease 05/11/2018     Resolved Ambulatory Problems     Diagnosis Date Noted    No Resolved Ambulatory Problems     No Additional Past Medical History       Family History   Problem Relation Age of Onset    Diabetes Mother         Type 2 DM    Hyperlipidemia Mother     Diabetes Father         Type 2 DM    Hyperlipidemia Father     Mental Illness Sister         Transgender sibling with depression/anxiety/bi polar    Bipolar Disorder Sister     GI problems Paternal Grandmother        Objective:     /80   Pulse 65   Temp 97.3  F (36.3  C)   Resp 12   Ht 1.854 m (6' 1\")   Wt 95.7 kg (211 lb)   SpO2 98%   BMI 27.84 kg/m      Patient is alert, in no obvious distress.   Skin: Warm, dry.  No lesions or rashes.  Skin turgor rapid return.   HEENT:  Head normocephalic, atraumatic.  Eyes normal.  PERRL.  EOM's intact.  No nystagmus. Ears normal.  Nose patent, mucosa pink.  Oropharynx mucosa pink.  No lesions or tonsillar enlargement.   Neck: Supple, no lymphadenopathy, JVD, bruits noted.  No thyromegaly.  Lungs:  Clear to " auscultation. Respirations even and unlabored.  No wheezing or rales noted.   Heart:  Regular rate and rhythm.  No murmurs, S3, S4, gallops, or rubs.    Abdomen: Soft, nontender.  No organomegaly. Bowel sounds normoactive. No guarding or masses noted.   : deferred  Musculoskeletal:  Full ROM of extremities.  DTRs symmetrical, sensations intact.  No obvious deformity.  Muscle strength equal +5/5.   Neurological:  Cranial nerves 2-12 intact.        Answers submitted by the patient for this visit:  Patient Health Questionnaire (Submitted on 4/1/2024)  If you checked off any problems, how difficult have these problems made it for you to do your work, take care of things at home, or get along with other people?: Somewhat difficult  PHQ9 TOTAL SCORE: 4

## 2024-05-02 ENCOUNTER — E-VISIT (OUTPATIENT)
Dept: FAMILY MEDICINE | Facility: CLINIC | Age: 34
End: 2024-05-02
Payer: COMMERCIAL

## 2024-05-02 DIAGNOSIS — Z71.84 COUNSELING ABOUT TRAVEL: Primary | ICD-10-CM

## 2024-05-02 PROCEDURE — 99421 OL DIG E/M SVC 5-10 MIN: CPT | Performed by: NURSE PRACTITIONER

## 2024-05-02 RX ORDER — CIPROFLOXACIN 500 MG/1
500 TABLET, FILM COATED ORAL 2 TIMES DAILY
Qty: 10 TABLET | Refills: 0 | Status: SHIPPED | OUTPATIENT
Start: 2024-05-02 | End: 2024-05-07

## 2024-05-02 NOTE — PATIENT INSTRUCTIONS
Thank you for choosing us for your care. I have placed an order for a prescription so that you can start treatment. View your full visit summary for details by clicking on the link below. Your pharmacist will able to address any questions you may have about the medication.     If you're not feeling better within 5-7 days, please schedule an appointment.  You can schedule an appointment right here in [facility], or call [phone]  If the visit is for the same symptoms as your eVisit, we'll refund the cost of your eVisit if seen within seven days.

## 2024-05-30 DIAGNOSIS — R41.840 DIFFICULTY CONCENTRATING: Primary | ICD-10-CM

## 2024-11-19 ASSESSMENT — ANXIETY QUESTIONNAIRES
5. BEING SO RESTLESS THAT IT IS HARD TO SIT STILL: SEVERAL DAYS
GAD7 TOTAL SCORE: 10
7. FEELING AFRAID AS IF SOMETHING AWFUL MIGHT HAPPEN: SEVERAL DAYS
1. FEELING NERVOUS, ANXIOUS, OR ON EDGE: MORE THAN HALF THE DAYS
IF YOU CHECKED OFF ANY PROBLEMS ON THIS QUESTIONNAIRE, HOW DIFFICULT HAVE THESE PROBLEMS MADE IT FOR YOU TO DO YOUR WORK, TAKE CARE OF THINGS AT HOME, OR GET ALONG WITH OTHER PEOPLE: SOMEWHAT DIFFICULT
6. BECOMING EASILY ANNOYED OR IRRITABLE: SEVERAL DAYS
8. IF YOU CHECKED OFF ANY PROBLEMS, HOW DIFFICULT HAVE THESE MADE IT FOR YOU TO DO YOUR WORK, TAKE CARE OF THINGS AT HOME, OR GET ALONG WITH OTHER PEOPLE?: SOMEWHAT DIFFICULT
7. FEELING AFRAID AS IF SOMETHING AWFUL MIGHT HAPPEN: SEVERAL DAYS
GAD7 TOTAL SCORE: 10
GAD7 TOTAL SCORE: 10
2. NOT BEING ABLE TO STOP OR CONTROL WORRYING: SEVERAL DAYS
4. TROUBLE RELAXING: MORE THAN HALF THE DAYS
3. WORRYING TOO MUCH ABOUT DIFFERENT THINGS: MORE THAN HALF THE DAYS

## 2024-11-19 ASSESSMENT — PATIENT HEALTH QUESTIONNAIRE - PHQ9
10. IF YOU CHECKED OFF ANY PROBLEMS, HOW DIFFICULT HAVE THESE PROBLEMS MADE IT FOR YOU TO DO YOUR WORK, TAKE CARE OF THINGS AT HOME, OR GET ALONG WITH OTHER PEOPLE: VERY DIFFICULT
SUM OF ALL RESPONSES TO PHQ QUESTIONS 1-9: 12
SUM OF ALL RESPONSES TO PHQ QUESTIONS 1-9: 12

## 2024-11-20 ENCOUNTER — VIRTUAL VISIT (OUTPATIENT)
Dept: PSYCHOLOGY | Facility: CLINIC | Age: 34
End: 2024-11-20
Payer: COMMERCIAL

## 2024-11-20 DIAGNOSIS — F41.1 GENERALIZED ANXIETY DISORDER: Primary | ICD-10-CM

## 2024-11-20 DIAGNOSIS — F33.1 MAJOR DEPRESSIVE DISORDER, RECURRENT EPISODE, MODERATE (H): ICD-10-CM

## 2024-11-20 PROCEDURE — 90791 PSYCH DIAGNOSTIC EVALUATION: CPT | Mod: 95 | Performed by: PSYCHOLOGIST

## 2024-11-20 ASSESSMENT — COLUMBIA-SUICIDE SEVERITY RATING SCALE - C-SSRS
TOTAL  NUMBER OF INTERRUPTED ATTEMPTS LIFETIME: NO
TOTAL  NUMBER OF ABORTED OR SELF INTERRUPTED ATTEMPTS LIFETIME: NO
6. HAVE YOU EVER DONE ANYTHING, STARTED TO DO ANYTHING, OR PREPARED TO DO ANYTHING TO END YOUR LIFE?: NO
1. HAVE YOU WISHED YOU WERE DEAD OR WISHED YOU COULD GO TO SLEEP AND NOT WAKE UP?: NO
2. HAVE YOU ACTUALLY HAD ANY THOUGHTS OF KILLING YOURSELF?: NO
ATTEMPT LIFETIME: NO

## 2024-11-21 ENCOUNTER — DOCUMENTATION ONLY (OUTPATIENT)
Dept: PSYCHOLOGY | Facility: CLINIC | Age: 34
End: 2024-11-21
Payer: COMMERCIAL

## 2024-11-21 NOTE — PROGRESS NOTES
Name: John Judd  MRN:?[id number]  :?1990    Client completed the [place] Multiphasic Personality Inventory-3 (MMPI-3), a self-report personality inventory, as part of his evaluation. Validity scales indicate that the client was able to comprehend and respond relevantly to the test items. He is reporting diffuse cognitive difficulties including problems with memory, confusion and attention and concentration. He reports multiple somatic complaints that include head pain and neurological and gastrointestinal complaints. He is likely preoccupied with physical health concerns. He is prone to developing physical symptoms in response to stress. He reports lacking confidence, feeling worthless, and believing he is a burden to others. He is prone to rumination and is self-disparaging and intropunitive. He reports being indecisive and inefficacious. He reports negative emotional experiences including anxiety, anger, and fear. He reports an above average level of stress. He experiences excessive worry. He engages in compulsive behavior including repetitive checking. He engages in non-planful, impulsive behavior.

## 2024-11-26 ENCOUNTER — VIRTUAL VISIT (OUTPATIENT)
Dept: PSYCHOLOGY | Facility: CLINIC | Age: 34
End: 2024-11-26
Payer: COMMERCIAL

## 2024-11-26 ENCOUNTER — DOCUMENTATION ONLY (OUTPATIENT)
Dept: PSYCHOLOGY | Facility: CLINIC | Age: 34
End: 2024-11-26
Payer: COMMERCIAL

## 2024-11-26 DIAGNOSIS — F33.1 MAJOR DEPRESSIVE DISORDER, RECURRENT EPISODE, MODERATE (H): ICD-10-CM

## 2024-11-26 DIAGNOSIS — F41.1 GENERALIZED ANXIETY DISORDER: Primary | ICD-10-CM

## 2024-11-26 PROCEDURE — 90832 PSYTX W PT 30 MINUTES: CPT | Mod: 95 | Performed by: PSYCHOLOGIST

## 2024-11-26 ASSESSMENT — ANXIETY QUESTIONNAIRES: GAD7 TOTAL SCORE: 16

## 2024-11-26 NOTE — PROGRESS NOTES
"Name: John Judd  MRN:?[id number]  :?1990          Lianna Adult ADHD Rating Scale-IV: Self and Other Reports (BAARS-IV)   The BAARS-IV assesses for symptoms of ADHD that are experienced in one's daily life. This assessment measure includes self and collateral rating scales designed to provide information regarding current and childhood symptoms of ADHD including inattention, hyperactivity, and impulsivity. Self-report scores are reported as percentiles. Scores at the 76th-83rd percentile are considered marginal, scores at the 84th-92nd percentile are considered borderline, scores at the 93rd-95th percentile are considered mild, scores at the 96th-98th percentile are considered moderate, and those at the 99th percentile are considered severe. Collateral or \"other\" rating scales are reported as number of symptoms observed in comparison to those reported by the client. Norms and percentile scores are not available for collateral reports.    ?   Current Symptoms Scale--Self Report:    Client completed the self-report inventory of current symptoms. The results indicate that the client's Total ADHD Score was 60 which places them in the 99th percentile for overall ADHD symptoms. In addition, the client endorsed the following occur \"often\" or \"very often\": 9/9 (99th percentile) Inattention symptoms, 4/9 (95th percentile) Hyperactivity/Impulsivity symptoms, and 6/9 (96th percentile) Sluggish Cognitive Tempo symptoms. Overall, the results suggest the client is reporting severe symptoms of inattention and mild symptoms of hyperactivity/impulsivity at this time.    ?   Current Symptoms Scale--Other Report:   Client's ex-wife completed the collateral report inventory of current symptoms. Based on the collateral contact's observation of symptoms, the client demonstrates the following \"often\" or \"very often\": 6/9 Inattention symptoms, 1/5 Hyperactivity symptoms, 4/4 Impulsivity symptoms, and 5/9 Sluggish Cognitive Tempo " "symptoms. The collateral report's Total ADHD Score was 49. The collateral- and self-report scores are similar and suggest Client experiences symptoms of inattention and hyperactivity/impulsivity at this time.    Childhood Symptoms Scale--Self-Report:   Client completed the self-report inventory of childhood symptoms. The results indicate that the client's Total ADHD Score was 42 which places them in the 89th percentile for overall ADHD symptoms in childhood. In addition, the client endorsed having experienced the following \"often\" or \"very often\": 4/9 (89th percentile) Inattention symptoms and 3/9 (87th percentile) Hyperactivity-Impulsivity symptoms. Overall, the results suggest the client experienced borderline symptoms of inattention and borderline symptoms of hyperactivity/impulsivity in childhood.     Childhood Symptoms Scale--Other Report:   Client did not submit a collateral report for childhood symptoms.    Lianna Functional Impairment Scale: Self and Other Reports (BFIS)   The BFIS is used to assess an individuals' psychosocial impairment in major life/daily activities that may be due to a mental health disorder. This assessment measure includes self and collateral rating scales. Self-report scores are reported as percentiles. Scores at the 76th-83rd percentile are considered marginal, scores at the 84th-92nd percentile are considered borderline, scores at the 93rd-95th percentile are considered mild, scores at the 96th-98th percentile are considered moderate, and those at the 99th percentile are considered severe. Collateral or \"other\" rating scales are reported as number of symptoms observed in comparison to those reported by the client. Norms and percentile scores are not available for collateral reports.    ?   Results indicate the client identified impairment (scores at or greater than 93rd percentile) in the following areas: home-chores, work, community activities, dating/marriage, money management, " "sexual relations, daily responsibilities, self-care routines and health maintenance. The client's Mean Impairment Score was 5.71 (94th percentile) indicating the client is reporting mild impairment in functioning across domains. Client's ex-wife completed the collateral report for this measure which demonstrated similar results (e.g., mean impairment score of 5.85). She noted impairment in the areas of: home-family, home-chores, education, dating/marriage, money management, sexual relations, and health maintenance.      [place]na Deficits in Executive Functioning Scale (BDEFS)   The BDEFS is a measure used for evaluating dimensions of adult executive functioning in daily life. This assessment measure includes self and collateral rating scales. Self-report scores are reported as percentiles. Scores at the 76th-83rd percentile are considered marginal, scores at the 84th-92nd percentile are considered borderline, scores at the 93rd-95th percentile are considered mild, scores at the 96th-98th percentile are considered moderate, and those at the 99th percentile are considered severe. Collateral or \"other\" rating scales are reported as number of symptoms observed in comparison to those reported by the client. Norms and percentile scores are not available for collateral reports.    ?   Results indicate the client's Total Executive Functioning Score was 229 (97th percentile). The ADHD-Executive Functioning Index score was 31 (98th percentile). These scores suggest the client is reporting moderate deficits in executive functioning. Client reported the following deficits: self-management to time (moderate) and self-organization/problem-solving (moderate). Client's ex-wife completed the collateral report which demonstrated similar results. She noted deficits in the areas of: self-organization/problem-solving, self-restraint, self-motivation and self-regulation of emotions.     Generalized Anxiety Disorder Questionnaire (KASANDRA-7) "   This questionnaire is designed to screen for anxiety in adults. Based on the client's score of 11, they are reporting moderate symptoms of anxiety at this time. Client endorsed the following symptoms of anxiety: feeling nervous/anxious/on edge; not being able to stop or control worrying; worrying too much about different things; trouble relaxing; restlessness; becoming easily annoyed or irritable and feeling afraid as if something awful might happen.     Patient Health Questionnaire- 9 (PHQ-9)    This questionnaire is designed to screen for depression in adults. Based on the client's score of 14, they are reporting moderate symptoms of depression at this time. Client endorsed the following symptoms of depression: little interest or pleasure in doing things; feeling down/depressed/hopeless; trouble falling asleep/staying asleep/sleep too much; feeling tired or having little energy; poor appetite or overeating; feeling bad about self; poor concentration; and feeling fidgety/restless.

## 2024-11-26 NOTE — PROGRESS NOTES
Progress Note     Client Name:  John Judd Date: 11/26/2024         Service Type: Individual    Telemedicine Visit: The patient's condition can be safely assessed and treated via synchronous audio and visual telemedicine encounter.      Reason for Telemedicine Visit: Services only offered telehealth    Originating Site (Patient Location): Patient's home        Distant Location (provider location):  Off-site    Consent:  The patient/guardian has verbally consented to: the potential risks and benefits of telemedicine (video visit) versus in person care; bill my insurance or make self-payment for services provided; and responsibility for payment of non-covered services.     Mode of Communication:  Video Conference via [employer]    As the provider I attest to compliance with applicable laws and regulations related to telemedicine.     Session Start Time: 11:00  Session End Time: 11:30     Session Length: 30 minutes    Session #: 2     Attendees: Client attended alone     Intervention: reviewed strategies for managing anxiety and depression; motivational interviewing: explored potential barriers for making healthy changes    Identifying Information:  Client is a 34 year old,  male. Client was referred for a diagnostic assessment by PCP.  The purpose of this evaluation is to: provide treatment recommendations and clarify diagnosis.  Client is currently employed full time and reports he is able to function appropriately at work.. Client attended the session alone.       Client's Statement of Presenting Concern:  Client reported seeking services at this time for diagnostic assessment and recommendations for treatment. Client's presenting concerns include: When he leaves the house, he checks his pockets for his phone, keys and wallet to be sure he has them. Since having kids, he is able to live in more chaos/mess and not be as bothered by it as he might have been before. If it was his choice, he would live  " and tidier. He can be impulsive and might go buy fast food spontaneously instead of looking in the fridge for something to eat. He can't focus. His mind will wander. He will have an intention to start a conversation with someone and forget what he wanted to say. Client will procrastinate and put off getting started on things. If he doesn't do things right away, he won't get them done until right before the deadline. Client uses calendars and lists otherwise he will forget things. He needs to write things down. He will start multiple tasks and bounce back and forth between them. Sometimes he leaves things unfinished. He has half-completed projects and this annoys him. He tries to avoid doing this and tries to do one thing at a time. Client has a lot of piles of things all over. They aren't as organized as they should be. He loses things if he doesn't put them back in their designated space (\"don't put it down, put it away\"). He has been working on this for the last few years. Client has difficulty listening in conversations. His thoughts will distract him. During meetings, his mind will wander and he will miss what is being said. He has \"spacey brain bouncing between thousands of ideas.\" He will talk excessively and interrupt people. He also will lose his train of thought and forgets what he wanted to say. He has difficulty sitting still and is often fidgety and restless. He has to be moving around. He might bounce his leg up and down or play with something in his hands (e.g., cracking knuckles or playing with ring, rubbing his neck) or tapping his foot. He has found it difficult to relax (which is why he was using cannabis). His mind is always busy. He will have football on one TV screen and another TV show on another screen and he will also be on his phone. He has to have multiple sources of stimulation at once. He wonders if he has anxiety or ADHD. He thinks he has compensated over the years and has tried " "to mask these things in adulthood. Client stated that symptoms have resulted in the following functional impairments: home life with ex wife and kids, management of the household and or completion of tasks, and self-care.              History of Presenting Concern:  Client reported that he has not completed a previous ADHD diagnostic assessment.  Client has not received a previous diagnosis of ADHD.  Client reported that medication has not been prescribed medication to address these problems. Client reported that these problems began in childhood (really worsened in college when he lost the structure/routine of high school and living at home). Client has not attempted to resolve these concerns in the past. Client reported that other professional(s) are involved in providing support / services. These include  medications from PCP and therapy .  Client is prescribed Zoloft by PCP. There has been a lot of work and life stress so he has been more anxious lately. He is going through a divorce. Client has been working with a therapist for the last 1.5 years (Nathanael through [facility], [facility]).       Social History:  As a child, client reported that he had problems with organization and keeping track of items, misplaced or lost things, forgot school work or other items between home and school, displayed argumentative or oppositional behaviors, and had problems managing temper with frequent emotional outbursts. He always felt like he was rushing, but he wasn't late. Client reported no difficulty with childhood peer relationships. He was very social and friendly with everybody. He was the \"weird moving tawana with energy.\" As a child, client reported having sleep disturbance, including: frequent dreams / nightmares .  Client reported currently experiencing sleep disturbance, including: insomnia.  Client reported sleeping approximately 6-8 hours per night. He doesn't feel he sleeps well. He wants to oversleep to " "compensate. He will lay awake stressing about things. He will roll over in the middle of the night and think about work and it is hard to fall back asleep. Client reported that he has not completed a sleep study.  Client reported having a well balanced diet.  There are not significant nutritional concerns.  Client reported sporadic exercise patterns.      Client's highest education level was college graduate. Client graduated high school in 2008. He estimated he obtained mostly Bs. During the elementary, middle, and high school years, patient recalls academic strengths in the area of math and science. He also liked reading and language. Client reported experiencing academic problems in art. Client did identify the following learning problems: attention and concentration. Client did not receive tutoring services during the school years. Client did not receive special education services. Client reported significant behavior and discipline problems including: disruptive classroom behavior and failure to finish or complete homework. He got kicked out at age 15 for fighting and selling drugs. He went to an alternative program for 1.5 years and then came back to his original high school to graduate. When he was there, he didn't feel challenged. It was \"too easy because I was in school with a bunch of screw ups.\" Client procrastinated a lot. If he didn't do things right away, he murphy forget. He would do papers at the last minute (the day before they were due). He usually turned things in on time. He did not have attendance issues. Client felt distracted by noises going on in the classroom. It was hard to pay attention. He would zone out and doodle. He would miss sections of what was being said. He could be talkative and disruptive at times. Client did attend post-secondary school. He graduated from college in 2014 from [employer] with a degree in health care management (business degree). Client did not graduate \"on time.\" He " noted that his parents had helped him to stay on track when he was younger. When he got to college, there was less structure. He was drinking and socializing. He skipped class and would miss tests and fall behind. He had to retake classes. He estimated he obtained Bs and Cs. Client reported that he procrastinated on things and would wait until the last minute to do homework. He tried his roommate's Adderall and this helped him to focus and get things done (they'd spend all night getting things done). It was hard to pay attention in class and he couldn't concentrate.      Client reported that he is currently employed full-time.  He works in health care analytics. He has been at his job for 7-8 years. Client reported that the current job is a good fit for his skills and personality.  Client reported that he been frequently late for work, frequently made mistakes with poor attention to detail, often felt bored, often been late in completing projects, disorganized behavior, and distractible behavior . It can be hard to get to work on time. He makes mistakes and misses things. He bounces between tasks and can't prioritize things. He feels overwhelmed and stressed and he can't focus on one thing at a time. He is forgetful. Someone will ask him to do something else and he will forget to go back to the original task and miss deadlines. When he gets bored, he might look at his phone and lose track of time. He will get up to do dishes. 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  The longest period of employment has been 7 years. Client has been terminated from a place of employment. He was fired from a job in college for not showing up once to a  "shift.          Risk Taking Behaviors:  Client reported a history of the following risk taking behaviors: risky sexual behaviors (in the last few years with \"new encounters\") - this has improved/changed since Spring 2024. He stays away from casinos so that he doesn't \"overindulge.\" He once intended to spend $20 and he ended up spending $150 on a slot machine. He once tried ecstasy in college at a party.      Motor Vehicle Operation:  Client has received a 's license.  Client has not received any moving violations.  Client reported the following driving habits: attentive and cautious and often exceeds the speed limit / speeds.  According to client, other people are comfortable riding as a passenger when he is driving.        Mental Status Assessment:  Appearance:   Appropriate   Eye Contact:   Good   Psychomotor Behavior: Normal   Attitude:   Cooperative   Orientation:   All  Speech   Rate / Production: Normal    Volume:  Normal   Mood:    Normal  Affect:    Appropriate   Thought Content:  Clear   Thought Form:  Coherent  Logical   Insight:    Good       Review of Symptoms:  Depression:     Lack of interest or pleasure in doing things, Feeling sad, down, or depressed, Feelings of hopelessness, Change in energy level, Change in sleep, Change in appetite, Low self-worth, Difficulties concentrating, and Psychomotor slowing or agitation  Angela:             No Symptoms  Psychosis:       No Symptoms  Anxiety:           Excessive worry, Nervousness, Psychomotor agitation, Poor concentration, and Irritability  Panic:              No symptoms  Post Traumatic Stress Disorder:  No Symptoms   Eating Disorder:          No Symptoms  Conduct Disorder:       No symptoms  Autism Spectrum Disorder:     No symptoms  Obsessive Compulsive Disorder:       No Symptoms  ADD / ADHD: Attention Listening Task Completion Organization Distractiblity Forgetful Interrupts  Reckless Behavior: Impulsive Decision Making        Safety Issues and " "Plan for Safety and Risk Management:  Client denies a history of suicidal ideation, suicide attempts, self-injurious behavior, homicidal ideation, homicidal behavior, and and other safety concerns    Client denies current fears or concerns for personal safety.  Client denies current or recent suicidal ideation or behaviors.  Client denies current or recent homicidal ideation or behaviors.  Client denies current or recent self injurious behavior or ideation.  Client denies other safety concerns.  Client reports there are no firearms in the house.  Recommended that patient call 911 or go to the local ED should there be a change in any of these risk factors        Diagnostic Criteria:  Attention Deficit Hyperactivity Disorder  A) A persistent pattern of inattention and/or hyperactivity-impulsivity that interferes with functioning or development, as characterized by (1) Inattention and/or (2) Hyperactivity and Impulsivity  - Often fails to give close attention to details or makes careless mistakes in schoolwork, at work, or during other activities  - Often has difficulty sustaining attention in tasks or play activities  - Often does not seem to listen when spoken to directly  - Often does not follow through on instructions and fails to finish schoolwork, chores, or duties in the workplace  - Often has difficulty organizing tasks and activities  - Often avoids, dislikes, or is reluctant to engage in tasks that require sustained mental effort  - Often loses things necessary for tasks or activities  - Is often easily distractedby extraneous stimuli  - Is often forgetful in daily activities  - Often fidgets with or taps hands or feet or squirms in seat  - Often leaves seat in situations when remaining seated is expected  - Often runs about or climbs in situationswhere it is inappropriate  - Often unable to play or engage in leisure activities quietly  - Is often \"on the go,\" acting as if \"driven by a motor\"  - Often talks " excessively  - Often blurts out an answer before a question has been completed  - Often has difficulty waiting his or her turn  - Often interrupts or intrudes on others    Generalized Anxiety Disorder  A. Excessive anxiety and worry about a number of events or activities (such as work or school performance).   B. The person finds it difficult to control the worry.  C. Select 3 or more symptoms (required for diagnosis). Only one item is required in children.   - Restlessness or feeling keyed up or on edge.    - Being easily fatigued.    - Difficulty concentrating or mind going blank.    - Irritability.    - Muscle tension.    - Sleep disturbance (difficulty falling or staying asleep, or restless unsatisfying sleep).   D. The focus of the anxiety and worry is not confined to features of an Axis I disorder.  E. The anxiety, worry, or physical symptoms cause clinically significant distress or impairment in social, occupational, or other important areas of functioning.   F. The disturbance is not due to the direct physiological effects of a substance (e.g., a drug of abuse, a medication) or a general medical condition (e.g., hyperthyroidism) and does not occur exclusively during a Mood Disorder, a Psychotic Disorder, or a Pervasive Developmental Disorder.     Major Depressive Disorder   - Depressed mood. Note: In children and adolescents, can be irritable mood.     - Diminished interest or pleasure in all, or almost all, activities.    - Significant weight loss when not dieting decrease in appetite.    - Decreased sleep.    - Psychomotor activity agitation.    - Fatigue or loss of energy.    - Feelings of worthlessness or inappropriate and excessive guilt.    - Diminished ability to think or concentrate, or indecisiveness.   B) The symptoms cause clinically significant distress or impairment in social, occupational, or other important areas of functioning  C) The episode is not attributable to the physiological effects  of a substance or to another medical condition  D) The occurence of major depressive episode is not better explained by other thought / psychotic disorders  E) There has never been a manic episode or hypomanic episode    Functional Status:  Client's symptoms have caused reduced functional status in the following areas: home life with ex wife and kids, management of the household and or completion of tasks, and self-care.            DSM-5 Diagnoses: (Sustained by DSM5 Criteria Listed Above)    296.32 (F33.1) Major Depressive Disorder, Recurrent Episode, Moderate   300.02 (F41.1) Generalized Anxiety Disorder  RULE OUT: ADHD    Attendance Agreement:  Client has signed Attendance Agreement:No: unable to sign via telehealth      Preliminary Plan:  The client reports no currently identified Baptism, ethnic or cultural issues relevant to therapy.     services are not indicated.    Modifications to assist communication are not indicated.    Collaboration / coordination of treatment will be initiated with the following support professionals: primary care physician and outpatient therapist.    Referral to another professional/service is not indicated at this time..    A Release of Information is not needed at this time.    Client was given self and collaborative rating scales to be completed prior to the next appointment.  Client consented to sending/receiving these measures via email.  Depression and anxiety rating scales were completed.  A third appointment was not scheduled at this time.     Report to child / adult protection services was NA.    Patient will have open access to their mental health medical record.    Leida Shea, PhD, LP  November 26, 2024

## 2025-03-03 ENCOUNTER — PATIENT OUTREACH (OUTPATIENT)
Dept: CARE COORDINATION | Facility: CLINIC | Age: 35
End: 2025-03-03
Payer: COMMERCIAL

## 2025-03-17 ENCOUNTER — PATIENT OUTREACH (OUTPATIENT)
Dept: CARE COORDINATION | Facility: CLINIC | Age: 35
End: 2025-03-17
Payer: COMMERCIAL

## 2025-04-05 DIAGNOSIS — F41.9 ANXIETY: ICD-10-CM

## 2025-04-05 DIAGNOSIS — F32.0 CURRENT MILD EPISODE OF MAJOR DEPRESSIVE DISORDER WITHOUT PRIOR EPISODE: ICD-10-CM

## 2025-04-07 RX ORDER — SERTRALINE HYDROCHLORIDE 100 MG/1
100 TABLET, FILM COATED ORAL DAILY
Qty: 90 TABLET | Refills: 3 | Status: SHIPPED | OUTPATIENT
Start: 2025-04-07

## 2025-05-17 ENCOUNTER — HEALTH MAINTENANCE LETTER (OUTPATIENT)
Age: 35
End: 2025-05-17